# Patient Record
Sex: FEMALE | Race: WHITE | NOT HISPANIC OR LATINO | Employment: OTHER | URBAN - METROPOLITAN AREA
[De-identification: names, ages, dates, MRNs, and addresses within clinical notes are randomized per-mention and may not be internally consistent; named-entity substitution may affect disease eponyms.]

---

## 2023-03-25 PROBLEM — R07.9 CHEST PAIN: Status: ACTIVE | Noted: 2023-03-25

## 2023-03-25 PROBLEM — G24.01 DYSKINESIA, TARDIVE: Status: ACTIVE | Noted: 2023-03-25

## 2023-03-25 PROBLEM — R21 RASH OF UNKNOWN CAUSE: Status: ACTIVE | Noted: 2023-03-25

## 2023-03-25 PROBLEM — R51.9 HEADACHE: Status: ACTIVE | Noted: 2023-03-25

## 2023-03-25 PROBLEM — E03.9 HYPOTHYROIDISM: Status: ACTIVE | Noted: 2023-03-25

## 2023-03-25 PROBLEM — J45.909 ASTHMA (HHS-HCC): Status: ACTIVE | Noted: 2023-03-25

## 2023-03-25 PROBLEM — M25.559 HIP PAIN: Status: ACTIVE | Noted: 2023-03-25

## 2023-03-25 PROBLEM — R00.2 PALPITATIONS: Status: ACTIVE | Noted: 2023-03-25
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
## 2023-03-25 PROBLEM — M17.11 PRIMARY OSTEOARTHRITIS OF RIGHT KNEE: Status: ACTIVE | Noted: 2023-03-25

## 2023-03-25 PROBLEM — S69.90XA HAND INJURY: Status: ACTIVE | Noted: 2023-03-25

## 2023-03-25 PROBLEM — G47.00 INSOMNIA: Status: ACTIVE | Noted: 2023-03-25

## 2023-03-25 PROBLEM — E66.9 OBESITY: Status: ACTIVE | Noted: 2023-03-25

## 2023-03-25 PROBLEM — M19.019 ACROMIOCLAVICULAR ARTHROSIS: Status: ACTIVE | Noted: 2023-03-25

## 2023-03-25 PROBLEM — R53.81 MALAISE AND FATIGUE: Status: ACTIVE | Noted: 2023-03-25

## 2023-03-25 PROBLEM — M25.561 RIGHT KNEE PAIN: Status: ACTIVE | Noted: 2023-03-25

## 2023-03-25 PROBLEM — R29.898 WEAKNESS OF RIGHT LOWER EXTREMITY: Status: ACTIVE | Noted: 2023-03-25

## 2023-03-25 PROBLEM — F17.200 CURRENT SMOKER ON SOME DAYS: Status: ACTIVE | Noted: 2023-03-25

## 2023-03-25 PROBLEM — M65.4 TENOSYNOVITIS, DE QUERVAIN: Status: ACTIVE | Noted: 2023-03-25

## 2023-03-25 PROBLEM — N63.20 MASS OF LEFT BREAST ON MAMMOGRAM: Status: ACTIVE | Noted: 2023-03-25

## 2023-03-25 PROBLEM — M25.569 KNEE PAIN: Status: ACTIVE | Noted: 2023-03-25

## 2023-03-25 PROBLEM — J02.9 SORE THROAT: Status: ACTIVE | Noted: 2023-03-25

## 2023-03-25 PROBLEM — R73.09 ABNORMAL GLUCOSE: Status: ACTIVE | Noted: 2023-03-25

## 2023-03-25 PROBLEM — E03.9 HYPOACTIVE THYROID: Status: ACTIVE | Noted: 2023-03-25

## 2023-03-25 PROBLEM — B07.9 VERRUCOUS SKIN LESION: Status: ACTIVE | Noted: 2023-03-25

## 2023-03-25 PROBLEM — I49.1 PAC (PREMATURE ATRIAL CONTRACTION): Status: ACTIVE | Noted: 2023-03-25

## 2023-03-25 PROBLEM — S43.401A SPRAIN OF RIGHT SHOULDER: Status: ACTIVE | Noted: 2023-03-25

## 2023-03-25 PROBLEM — I25.119 ATHEROSCLEROSIS OF NATIVE CORONARY ARTERY WITH ANGINA PECTORIS (CMS-HCC): Status: ACTIVE | Noted: 2023-03-25
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
## 2023-03-25 PROBLEM — K21.9 ACID REFLUX: Status: ACTIVE | Noted: 2023-03-25

## 2023-03-25 PROBLEM — E55.9 VITAMIN D DEFICIENCY: Status: ACTIVE | Noted: 2023-03-25

## 2023-03-25 PROBLEM — S49.91XA RIGHT SHOULDER INJURY: Status: ACTIVE | Noted: 2023-03-25

## 2023-03-25 PROBLEM — B35.6 TINEA CRURIS: Status: ACTIVE | Noted: 2023-03-25

## 2023-03-25 PROBLEM — E66.812 CLASS 2 OBESITY WITH BODY MASS INDEX (BMI) OF 36.0 TO 36.9 IN ADULT: Status: ACTIVE | Noted: 2023-03-25

## 2023-03-25 PROBLEM — R13.10 DYSPHAGIA: Status: ACTIVE | Noted: 2023-03-25

## 2023-03-25 PROBLEM — Z96.659 STATUS POST TOTAL KNEE REPLACEMENT: Status: ACTIVE | Noted: 2023-03-25

## 2023-03-25 PROBLEM — I10 PRIMARY HYPERTENSION: Status: ACTIVE | Noted: 2023-03-25

## 2023-03-25 PROBLEM — I65.23 ASYMPTOMATIC BILATERAL CAROTID ARTERY STENOSIS: Status: ACTIVE | Noted: 2023-03-25

## 2023-03-25 PROBLEM — F33.9 MAJOR DEPRESSION, RECURRENT (CMS-HCC): Status: ACTIVE | Noted: 2023-03-25

## 2023-03-25 PROBLEM — I65.29 CAROTID ATHEROSCLEROSIS: Status: ACTIVE | Noted: 2023-03-25

## 2023-03-25 PROBLEM — M79.10 MYALGIA: Status: ACTIVE | Noted: 2023-03-25

## 2023-03-25 PROBLEM — M17.12 PRIMARY OSTEOARTHRITIS OF LEFT KNEE: Status: ACTIVE | Noted: 2023-03-25

## 2023-03-25 PROBLEM — E78.5 HYPERLIPIDEMIA: Status: ACTIVE | Noted: 2023-03-25

## 2023-03-25 PROBLEM — B37.2 YEAST DERMATITIS: Status: ACTIVE | Noted: 2023-03-25

## 2023-03-25 PROBLEM — L98.9 SKIN LESION: Status: ACTIVE | Noted: 2023-03-25

## 2023-03-25 PROBLEM — E66.9 CLASS 2 OBESITY WITH BODY MASS INDEX (BMI) OF 36.0 TO 36.9 IN ADULT: Status: ACTIVE | Noted: 2023-03-25

## 2023-03-25 PROBLEM — R49.0 HOARSENESS: Status: ACTIVE | Noted: 2023-03-25

## 2023-03-25 PROBLEM — A69.21: Status: ACTIVE | Noted: 2023-03-25

## 2023-03-25 PROBLEM — F41.9 ANXIETY AND DEPRESSION: Status: ACTIVE | Noted: 2023-03-25

## 2023-03-25 PROBLEM — S40.011A CONTUSION OF SHOULDER, RIGHT: Status: ACTIVE | Noted: 2023-03-25

## 2023-03-25 PROBLEM — I20.9 ANGINA PECTORIS (CMS-HCC): Status: ACTIVE | Noted: 2023-03-25

## 2023-03-25 PROBLEM — M19.049 CMC ARTHRITIS: Status: ACTIVE | Noted: 2023-03-25

## 2023-03-25 PROBLEM — R53.83 MALAISE AND FATIGUE: Status: ACTIVE | Noted: 2023-03-25

## 2023-03-25 PROBLEM — I20.9 ANGINA, CLASS II (CMS-HCC): Status: ACTIVE | Noted: 2023-03-25

## 2023-03-25 PROBLEM — I77.9 BILATERAL CAROTID ARTERY DISEASE (CMS-HCC): Status: ACTIVE | Noted: 2023-03-25

## 2023-03-25 PROBLEM — F32.A ANXIETY AND DEPRESSION: Status: ACTIVE | Noted: 2023-03-25

## 2023-03-25 PROBLEM — R05.9 COUGH: Status: ACTIVE | Noted: 2023-03-25

## 2023-03-25 PROBLEM — G89.18 POST-OP PAIN: Status: ACTIVE | Noted: 2023-03-25

## 2023-03-25 PROBLEM — M85.80 OSTEOPENIA: Status: ACTIVE | Noted: 2023-03-25

## 2023-03-25 PROBLEM — M65.30 TRIGGER FINGER: Status: ACTIVE | Noted: 2023-03-25

## 2023-03-25 PROBLEM — R63.5 WEIGHT GAIN, ABNORMAL: Status: ACTIVE | Noted: 2023-03-25

## 2023-03-25 PROBLEM — F13.939 BENZODIAZEPINE WITHDRAWAL (MULTI): Status: ACTIVE | Noted: 2023-03-25

## 2023-03-25 PROBLEM — M25.50 ARTHRALGIA: Status: ACTIVE | Noted: 2023-03-25

## 2023-03-25 PROBLEM — M79.646 FINGER PAIN: Status: ACTIVE | Noted: 2023-03-25

## 2023-03-25 RX ORDER — CLOPIDOGREL BISULFATE 75 MG/1
1 TABLET ORAL DAILY
COMMUNITY

## 2023-03-25 RX ORDER — CHOLECALCIFEROL (VITAMIN D3) 50 MCG
1 TABLET ORAL DAILY
COMMUNITY

## 2023-03-25 RX ORDER — VILAZODONE HYDROCHLORIDE 40 MG/1
1 TABLET ORAL DAILY
COMMUNITY
Start: 2018-12-27

## 2023-03-25 RX ORDER — MULTIVIT WITH MINERALS/HERBS
1 TABLET ORAL DAILY
COMMUNITY
Start: 2015-08-26

## 2023-03-25 RX ORDER — ATORVASTATIN CALCIUM 40 MG/1
1 TABLET, FILM COATED ORAL DAILY
COMMUNITY
Start: 2013-09-27

## 2023-03-25 RX ORDER — ZALEPLON 5 MG/1
5 CAPSULE ORAL
COMMUNITY
Start: 2021-05-20

## 2023-03-25 RX ORDER — LEVOTHYROXINE SODIUM 88 UG/1
1 TABLET ORAL DAILY
COMMUNITY
Start: 2013-10-03

## 2023-03-25 RX ORDER — TRAZODONE HYDROCHLORIDE 150 MG/1
2 TABLET ORAL NIGHTLY
COMMUNITY

## 2023-03-25 RX ORDER — MULTIVITAMIN/IRON/FOLIC ACID 18MG-0.4MG
TABLET ORAL
COMMUNITY
Start: 2015-08-26

## 2023-03-25 RX ORDER — CYCLOBENZAPRINE HCL 5 MG
1 TABLET ORAL NIGHTLY PRN
COMMUNITY

## 2023-03-25 RX ORDER — NABUMETONE 500 MG/1
1 TABLET, FILM COATED ORAL EVERY 12 HOURS PRN
COMMUNITY
Start: 2020-06-02 | End: 2023-11-22 | Stop reason: ALTCHOICE

## 2023-03-25 RX ORDER — CALCIUM CARBONATE 600 MG
1 TABLET ORAL DAILY
COMMUNITY

## 2023-03-25 RX ORDER — AMLODIPINE BESYLATE 5 MG/1
1 TABLET ORAL DAILY
COMMUNITY
Start: 2020-11-29

## 2023-03-25 RX ORDER — OMEPRAZOLE 40 MG/1
1 CAPSULE, DELAYED RELEASE ORAL DAILY
COMMUNITY
Start: 2015-02-04

## 2023-03-29 ENCOUNTER — OFFICE VISIT (OUTPATIENT)
Dept: PRIMARY CARE | Facility: CLINIC | Age: 73
End: 2023-03-29
Payer: MEDICARE

## 2023-03-29 VITALS
RESPIRATION RATE: 16 BRPM | SYSTOLIC BLOOD PRESSURE: 140 MMHG | WEIGHT: 192 LBS | DIASTOLIC BLOOD PRESSURE: 78 MMHG | HEART RATE: 67 BPM | OXYGEN SATURATION: 97 % | BODY MASS INDEX: 35.33 KG/M2 | HEIGHT: 62 IN

## 2023-03-29 DIAGNOSIS — E78.5 HYPERLIPIDEMIA, UNSPECIFIED HYPERLIPIDEMIA TYPE: ICD-10-CM

## 2023-03-29 DIAGNOSIS — F32.A DEPRESSION, UNSPECIFIED DEPRESSION TYPE: Primary | ICD-10-CM

## 2023-03-29 DIAGNOSIS — E03.9 HYPOTHYROIDISM, UNSPECIFIED TYPE: ICD-10-CM

## 2023-03-29 DIAGNOSIS — I10 PRIMARY HYPERTENSION: ICD-10-CM

## 2023-03-29 PROCEDURE — 99203 OFFICE O/P NEW LOW 30 MIN: CPT | Performed by: FAMILY MEDICINE

## 2023-03-29 RX ORDER — ALBUTEROL SULFATE 90 UG/1
2 AEROSOL, METERED RESPIRATORY (INHALATION) EVERY 6 HOURS PRN
COMMUNITY

## 2023-03-29 RX ORDER — BACLOFEN 20 MG
500 TABLET ORAL
COMMUNITY

## 2023-03-29 RX ORDER — BUSPIRONE HYDROCHLORIDE 7.5 MG/1
1 TABLET ORAL
COMMUNITY
Start: 2023-03-21

## 2023-03-29 ASSESSMENT — ENCOUNTER SYMPTOMS
HEMATOLOGIC/LYMPHATIC NEGATIVE: 1
EYES NEGATIVE: 1
DEPRESSION: 1
ENDOCRINE NEGATIVE: 1
OCCASIONAL FEELINGS OF UNSTEADINESS: 0
ALLERGIC/IMMUNOLOGIC NEGATIVE: 1
MUSCULOSKELETAL NEGATIVE: 1
GASTROINTESTINAL NEGATIVE: 1
CONSTITUTIONAL NEGATIVE: 1
RESPIRATORY NEGATIVE: 1
PSYCHIATRIC NEGATIVE: 1
LOSS OF SENSATION IN FEET: 0
CARDIOVASCULAR NEGATIVE: 1
NEUROLOGICAL NEGATIVE: 1

## 2023-03-29 NOTE — PROGRESS NOTES
"Subjective   Patient ID: Anh Patino is a 72 y.o. female who presents for New Patient Visit.    Annual physical  Eats a generally healthy diet  Exercises  Denies any chest pain,SOB  No Abdominal pain  No black or bloody stools  Urination/BM normal  No new family h/o cancers or heart disease       Pt c.o achy ness in both ears. X 2 weeks.   Denies ringing, drainage, muffled hearing.          Review of Systems   Constitutional: Negative.    HENT:  Positive for ear pain.    Eyes: Negative.    Respiratory: Negative.     Cardiovascular: Negative.    Gastrointestinal: Negative.    Endocrine: Negative.    Genitourinary: Negative.    Musculoskeletal: Negative.    Skin: Negative.    Allergic/Immunologic: Negative.    Neurological: Negative.    Hematological: Negative.    Psychiatric/Behavioral: Negative.         Objective   /78   Pulse 67   Resp 16   Ht 1.575 m (5' 2\")   Wt 87.1 kg (192 lb)   SpO2 97%   BMI 35.12 kg/m²     Physical Exam CONSTITUTIONAL- NAD, Pt is A & O x3, Vital signs reviewed per chart.  General Appearance- normal , good hygiene,talks easily  EYES-PERRLA conjunctiva and lids- normal  EARS/NOSE-TM's normal, head normocephalic and atraumatic, naso pharynx-no nasal discharge, no trismus,  oropharynx- normal without exudate  NECK- supple, FROM, without mass  thyroid- NT, normal size, no nodule noted  LYMPH- WNL  CV- RRR without murmur, gallop, or other abnormality.  PULM- CTA bilaterally  Respiratory effort- normal respiratory effort , no retractions or nasal flaring  ABDOMEN- normoactive BS's , soft , NT, no hepatosplenomegaly palpated, or masses. No pulsatile masses noted  extremities no edema,NT  SKIN- no abnormal skin lesions on inspection or palpation  neuro- no focal deficits  PSYCH- pleasant, normal judgement and insight      Assessment/Plan   Problem List Items Addressed This Visit       Hyperlipidemia    Hypothyroidism    Primary hypertension     Other Visit Diagnoses       Depression, " unspecified depression type    -  Primary             Scribe Attestation  By signing my name below, I, Javon Peralta DO , Scribluis daniel   attest that this documentation has been prepared under the direction and in the presence of Javon Peralta DO.

## 2023-09-05 LAB
ALANINE AMINOTRANSFERASE (SGPT) (U/L) IN SER/PLAS: 14 U/L (ref 7–45)
ALBUMIN (G/DL) IN SER/PLAS: 4 G/DL (ref 3.4–5)
ALKALINE PHOSPHATASE (U/L) IN SER/PLAS: 52 U/L (ref 33–136)
ANION GAP IN SER/PLAS: 9 MMOL/L (ref 10–20)
ASPARTATE AMINOTRANSFERASE (SGOT) (U/L) IN SER/PLAS: 18 U/L (ref 9–39)
BILIRUBIN TOTAL (MG/DL) IN SER/PLAS: 0.6 MG/DL (ref 0–1.2)
CALCIUM (MG/DL) IN SER/PLAS: 9.1 MG/DL (ref 8.6–10.3)
CARBON DIOXIDE, TOTAL (MMOL/L) IN SER/PLAS: 30 MMOL/L (ref 21–32)
CHLORIDE (MMOL/L) IN SER/PLAS: 107 MMOL/L (ref 98–107)
CHOLESTEROL (MG/DL) IN SER/PLAS: 137 MG/DL (ref 0–199)
CHOLESTEROL IN HDL (MG/DL) IN SER/PLAS: 60.1 MG/DL
CHOLESTEROL/HDL RATIO: 2.3
CREATININE (MG/DL) IN SER/PLAS: 1.11 MG/DL (ref 0.5–1.05)
ERYTHROCYTE DISTRIBUTION WIDTH (RATIO) BY AUTOMATED COUNT: 12.5 % (ref 11.5–14.5)
ERYTHROCYTE MEAN CORPUSCULAR HEMOGLOBIN CONCENTRATION (G/DL) BY AUTOMATED: 33.6 G/DL (ref 32–36)
ERYTHROCYTE MEAN CORPUSCULAR VOLUME (FL) BY AUTOMATED COUNT: 101 FL (ref 80–100)
ERYTHROCYTES (10*6/UL) IN BLOOD BY AUTOMATED COUNT: 3.88 X10E12/L (ref 4–5.2)
GFR FEMALE: 52 ML/MIN/1.73M2
GLUCOSE (MG/DL) IN SER/PLAS: 107 MG/DL (ref 74–99)
HEMATOCRIT (%) IN BLOOD BY AUTOMATED COUNT: 39.3 % (ref 36–46)
HEMOGLOBIN (G/DL) IN BLOOD: 13.2 G/DL (ref 12–16)
LDL: 53 MG/DL (ref 0–99)
LEUKOCYTES (10*3/UL) IN BLOOD BY AUTOMATED COUNT: 6.2 X10E9/L (ref 4.4–11.3)
PLATELETS (10*3/UL) IN BLOOD AUTOMATED COUNT: 207 X10E9/L (ref 150–450)
POTASSIUM (MMOL/L) IN SER/PLAS: 4.4 MMOL/L (ref 3.5–5.3)
PROTEIN TOTAL: 6.3 G/DL (ref 6.4–8.2)
SODIUM (MMOL/L) IN SER/PLAS: 142 MMOL/L (ref 136–145)
TRIGLYCERIDE (MG/DL) IN SER/PLAS: 120 MG/DL (ref 0–149)
UREA NITROGEN (MG/DL) IN SER/PLAS: 13 MG/DL (ref 6–23)
VLDL: 24 MG/DL (ref 0–40)

## 2023-09-08 ENCOUNTER — HOSPITAL ENCOUNTER (OUTPATIENT)
Dept: DATA CONVERSION | Facility: HOSPITAL | Age: 73
End: 2023-09-08
Attending: ORTHOPAEDIC SURGERY | Admitting: ORTHOPAEDIC SURGERY
Payer: MEDICARE

## 2023-09-08 DIAGNOSIS — G56.01 CARPAL TUNNEL SYNDROME, RIGHT UPPER LIMB: ICD-10-CM

## 2023-09-12 LAB
ATRIAL RATE: 57 BPM
P AXIS: 61 DEGREES
P OFFSET: 193 MS
P ONSET: 128 MS
PR INTERVAL: 168 MS
Q ONSET: 212 MS
QRS COUNT: 10 BEATS
QRS DURATION: 90 MS
QT INTERVAL: 428 MS
QTC CALCULATION(BAZETT): 416 MS
QTC FREDERICIA: 420 MS
R AXIS: -14 DEGREES
T AXIS: 0 DEGREES
T OFFSET: 426 MS
VENTRICULAR RATE: 57 BPM

## 2023-09-29 VITALS — BODY MASS INDEX: 39.17 KG/M2 | WEIGHT: 199.52 LBS | HEIGHT: 60 IN

## 2023-09-30 NOTE — H&P
History & Physical Reviewed:   I have reviewed the History and Physical dated:  24-Aug-2023   History and Physical reviewed and relevant findings noted. Patient examined to review pertinent physical  findings.: No significant changes   Home Medications Reviewed: no changes noted   Allergies Reviewed: no changes noted     Consent:   COVID-19 Consent:  ·  COVID-19 Risk Consent Surgeon has reviewed key risks related to the risk of rahul COVID-19 and if they contract COVID-19 what the risks are.       Electronic Signatures:  Mitchell Lebron ()  (Signed 08-Sep-2023 07:29)   Authored: History & Physical Reviewed, Consent, Note  Completion      Last Updated: 08-Sep-2023 07:29 by Mitchell Lebron ()

## 2023-10-01 NOTE — OP NOTE
Post Operative Note:     Post-Procedure Diagnosis: Right carpal tunnel syndrome   Procedure: 1.   2.   3.   4.   5.   Surgeon: Mitchell Lebron D.O.   Resident/Fellow/Other Assistant: ERROL Doe   Estimated Blood Loss (mL): Less than 10 cc   Specimen: no   Findings: Right carpal tunnel syndrome     Operative Report Dictated:  Dictation: not applicable - note contains Operative  Report   Operative Report:    Preoperative diagnosis: Right carpal tunnel syndrome    Postoperative diagnosis: Right carpal tunnel syndrome    Procedure planned: Right carpal tunnel release    Procedure performed: Right carpal tunnel release    Surgeon: Mitchell Lebron D.O.    Assistant:ERROL Doe  The physician assistant was present to the entire case. Given the nature of the disease process and the procedure to be performed a skilled surgical assistant was necessary during the case. The assistant was necessary in order to hold retractors and directly  assist in the operation. A certified scrub tech was at the back table managing instruments and supplies for the surgical case.    Anesthesia: Local field block using lidocaine with epinephrine solution and monitored by the anesthesia team    Estimated blood loss: Less than 10 mL    Drains: None    Tourniquet: None    Specimens: None    Implants: None    Indications: The patient presented to the office with subjective symptoms physical examination an EMG nerve conduction study test consistent with right carpal tunnel syndrome.  The patient had failure of nonoperative treatment strategies.  After full  discussion regarding risks benefits and alternatives the patient elected to forego any additional nonoperative management in favor of right carpal tunnel release.  Informed consent was signed and placed in the chart.    Complications: None noted at the time of surgery    Description of operation: The patient was taken to the operative suite and placed in the supine position on  the operating table.  A timeout was performed and the right carpal tunnel was confirmed to be the operative site.  The patient was carefully positioned  on the table in such a fashion as to pad all bony prominences and peripheral nerves.  The patient was administered appropriate preoperative IV antibiotics.  The patient was then prepped and draped in the normal sterile fashion.  After prepping and draping  a longitudinal incision was marked out directly overlying the transverse carpal ligament in line with the ring finger ray.  Forceps were used to gently grasp and pinch the skin in the zone of intended surgical dissection to check for adequate anesthesia.   After confirming adequate anesthesia the 15 blade was used to incise skin and dissect down to the subcutaneous plane.  The palmar aponeurosis was divided in line with the incision to expose the transverse carpal ligament.  The transverse carpal ligament  was then meticulously divided under direct visualization, working from distal to proximal, taking care to avoid iatrogenic injury to the underlying contents of the carpal tunnel.  The tenotomy scissors were used to release the most proximal fibers of  the transverse carpal ligament along with the most distal fibers of the antebrachial fascia.  This was done under direct visualization.  The distal release of the transverse carpal ligament was carried to the level of the fat change.  Direct inspection  and digital palpation confirmed complete release of the carpal tunnel.  Copious irrigation was performed.  Interrupted nylon stitches were used to close the skin.  A bulky nonadherent dressing was placed.  The patient was then allowed to head to recovery  in stable condition.  Overall the patient tolerated the procedure well.    Disposition: Stable to PACU          Electronic Signatures:  Mitchell Lebron ()  (Signed 08-Sep-2023 08:11)   Authored: Post Operative Note, Note Completion      Last Updated: 08-Sep-2023  08:11 by Mitchell Lebron (DO)

## 2023-11-22 ENCOUNTER — OFFICE VISIT (OUTPATIENT)
Dept: ORTHOPEDIC SURGERY | Facility: CLINIC | Age: 73
End: 2023-11-22
Payer: MEDICARE

## 2023-11-22 ENCOUNTER — ANCILLARY PROCEDURE (OUTPATIENT)
Dept: RADIOLOGY | Facility: CLINIC | Age: 73
End: 2023-11-22
Payer: MEDICARE

## 2023-11-22 DIAGNOSIS — Z47.1 AFTERCARE FOLLOWING RIGHT KNEE JOINT REPLACEMENT SURGERY: ICD-10-CM

## 2023-11-22 DIAGNOSIS — Z47.1 AFTERCARE FOLLOWING LEFT KNEE JOINT REPLACEMENT SURGERY: Primary | ICD-10-CM

## 2023-11-22 DIAGNOSIS — Z96.651 AFTERCARE FOLLOWING RIGHT KNEE JOINT REPLACEMENT SURGERY: ICD-10-CM

## 2023-11-22 DIAGNOSIS — Z96.653 STATUS POST BILATERAL KNEE REPLACEMENTS: ICD-10-CM

## 2023-11-22 DIAGNOSIS — Z96.652 AFTERCARE FOLLOWING LEFT KNEE JOINT REPLACEMENT SURGERY: Primary | ICD-10-CM

## 2023-11-22 PROCEDURE — 3008F BODY MASS INDEX DOCD: CPT | Performed by: ORTHOPAEDIC SURGERY

## 2023-11-22 PROCEDURE — 73562 X-RAY EXAM OF KNEE 3: CPT | Mod: BILATERAL PROCEDURE | Performed by: ORTHOPAEDIC SURGERY

## 2023-11-22 PROCEDURE — 99213 OFFICE O/P EST LOW 20 MIN: CPT | Performed by: ORTHOPAEDIC SURGERY

## 2023-11-22 PROCEDURE — 1036F TOBACCO NON-USER: CPT | Performed by: ORTHOPAEDIC SURGERY

## 2023-11-22 PROCEDURE — 73562 X-RAY EXAM OF KNEE 3: CPT | Mod: 50,FY

## 2023-11-22 PROCEDURE — 1159F MED LIST DOCD IN RCRD: CPT | Performed by: ORTHOPAEDIC SURGERY

## 2023-11-22 PROCEDURE — 99213 OFFICE O/P EST LOW 20 MIN: CPT | Mod: 25 | Performed by: ORTHOPAEDIC SURGERY

## 2023-11-22 RX ORDER — NABUMETONE 750 MG/1
750 TABLET, FILM COATED ORAL 2 TIMES DAILY
Qty: 90 TABLET | Refills: 3 | Status: SHIPPED | OUTPATIENT
Start: 2023-11-22 | End: 2024-05-20

## 2023-11-22 NOTE — PROGRESS NOTES
History of present illness    73-year-old female here for follow-up as needed she has had her left knee replaced in December 2007 her right knee revision was February 2010 she states both knees are feeling pretty good the Relafen seems to be helping she does not have any side effects from the medication she is ambulating without assistive eyes no numbness or tingling no fevers or chills no locking or giving way.      Past medical , Surgical, Family and social history reviewed.      Physical exam  General: No acute distress and breathing comfortably.  Patient is pleasant and cooperative with the examination.    Extremity  Both knees have excellent range of motion without instability no tenderness brisk cap refill compartments are soft calf is nontender    Diagnostics  See dictated x-ray results    No results found.     Procedure  [ none]    Assessment  Status post bilateral knee replacements    Treatment plan  1.  Continue working on range of motion strengthening.  She is requesting a refill on her Relafen which is sent to pharmacy.  Long-term use of nonsteroidal anti-inflammatory drugs was discussed as part of the treatment plan.  We discussed that these may result in GI upset and/or bleeding.  Any stomach pain or dark stools would be reason to discontinue use.  We discussed that when used over long-term these medications can result in altered renal or hepatic function and it used to be on 3 months requires follow-up with her primary provider for monitoring.  Patient expressed understanding and agree with this plan.  2.  She will follow-up if she has increased pain or discomfort otherwise..  3. [   ]  4.  All of the patient's questions were answered.    This note was prepared using voice recognition software.  The details of this note are correct and have been reviewed, and corrected to the best of my ability.  Some grammatical areas may persist related to the Dragon software    Marty WATSON  MD Abigail  Senior Attending Physician  [facility]  [facility] [place]    [phone]

## 2024-02-26 ENCOUNTER — HOSPITAL ENCOUNTER (OUTPATIENT)
Dept: RADIOLOGY | Facility: CLINIC | Age: 74
Discharge: HOME | End: 2024-02-26
Payer: MEDICARE

## 2024-02-26 ENCOUNTER — OFFICE VISIT (OUTPATIENT)
Dept: ORTHOPEDIC SURGERY | Facility: CLINIC | Age: 74
End: 2024-02-26
Payer: MEDICARE

## 2024-02-26 DIAGNOSIS — M25.511 RIGHT SHOULDER PAIN, UNSPECIFIED CHRONICITY: ICD-10-CM

## 2024-02-26 PROCEDURE — 73030 X-RAY EXAM OF SHOULDER: CPT | Mod: RT

## 2024-02-26 PROCEDURE — 1036F TOBACCO NON-USER: CPT | Performed by: ORTHOPAEDIC SURGERY

## 2024-02-26 PROCEDURE — 1157F ADVNC CARE PLAN IN RCRD: CPT | Performed by: ORTHOPAEDIC SURGERY

## 2024-02-26 PROCEDURE — 73030 X-RAY EXAM OF SHOULDER: CPT | Mod: RIGHT SIDE | Performed by: ORTHOPAEDIC SURGERY

## 2024-02-26 PROCEDURE — 99214 OFFICE O/P EST MOD 30 MIN: CPT | Performed by: ORTHOPAEDIC SURGERY

## 2024-02-26 RX ORDER — METHYLPREDNISOLONE 4 MG/1
TABLET ORAL
Qty: 1 EACH | Refills: 0 | Status: SHIPPED | OUTPATIENT
Start: 2024-02-26

## 2024-02-26 NOTE — PROGRESS NOTES
History of Present Illness   Chief Complaint   Patient presents with    Right Shoulder - Establish Care     Pt. Known to Dr. Ocampo, had a Rt RCR in 2017  Fell about 1 month ago  X-rays today       History of Present Illness   Patient comes in today after a fall a month ago.  She had a right shoulder mini open rotator cuff repair of subscapularis and supraspinatus, biceps tenodesis on 4/26/2017  She states that shoulder is doing great since her surgery.  This is most reasonable adjustment little sore and uncomfortable she feels little bit weak no other specific concerns     imaging  Shoulder: No acute fractures dislocations.  No arthritic change.     Assessment:   Right shoulder rotator cuff contusion with possible rotator cuff tear    Plan:  We reviewed the role of imaging, physical therapy, injections and the time frame to healing and correlation with outcome.  1.  Medrol Dosepak: Medication benefits and risks discussed  2.  NSAID: Nabumetone sent to the pharmacy.  GI side effects and medical risks discussed  3.  Ice: 30 minutes on and off  4.  Exercise home program: Medically directed Shoulder therapy / Handout given  5.  Follow-up in 4 weeks if weakness persist.  MRI     Physical Exam  Well-nourished, well-developed. No acute distress. Alert and oriented x3. Responds appropriately to questioning. Good mood. Normal affect.  Physical Exam  Right shoulder:  Skin healthy to gross inspection. No ecchymosis, no swelling, no gross atrophy  No tenderness to palpation over acromioclavicular joint  Mild pain in the biceps  ROM: 130 forward flexion  Strength: 4.5/5 Resisted elevation, external rotation   Pain with lift off and Speeds test + impingement  Negative Spurling´s test  Positive Neer and Hawkin´s test  Neurovascular exam normal distally     Review of Systems   GENERAL: Negative for malaise, significant weight loss, fever  MUSCULOSKELETAL: See HPI  NEURO:  Negative     Past Medical History:   Diagnosis Date     Acute candidiasis of vulva and vagina 09/04/2015    Vaginal candidiasis    Cervicalgia 04/13/2016    Neck pain    Disorientation, unspecified 08/10/2015    Episodic confusion    Encounter for other screening for malignant neoplasm of breast 09/04/2015    Screening for breast cancer    Impacted cerumen, left ear 01/11/2016    Impacted cerumen of left ear    Insect bite (nonvenomous) of lower back and pelvis, initial encounter 09/04/2015    Tick bite of back    Lyme disease, unspecified 09/04/2015    Erythema chronicum migrans    Other acute postprocedural pain 03/30/2022    Post-op pain    Other symptoms and signs involving the musculoskeletal system 06/29/2015    Weakness of hand    Personal history of diseases of the skin and subcutaneous tissue 01/11/2016    History of seborrhea    Personal history of other diseases of the circulatory system 03/05/2015    History of hypertension    Personal history of other diseases of the circulatory system     History of carotid artery stenosis    Personal history of other diseases of the musculoskeletal system and connective tissue 09/25/2015    History of backache    Personal history of other diseases of the respiratory system 07/01/2016    History of acute bronchitis    Personal history of other mental and behavioral disorders 04/06/2016    History of depression    Personal history of other specified conditions 09/25/2015    History of urinary frequency    Personal history of other specified conditions 08/30/2015    History of headache    Unspecified condition associated with female genital organs and menstrual cycle 01/11/2016    Vaginal burning    Urinary tract infection, site not specified 09/25/2015    Acute UTI    Urinary tract infection, site not specified 09/25/2015    Acute lower UTI    Urinary tract infection, site not specified 01/11/2016    UTI (lower urinary tract infection)       Medication Documentation Review Audit       Reviewed by Haleigh Alonso MA  (Medical Assistant) on 11/22/23 at 1446      Medication Order Taking? Sig Documenting Provider Last Dose Status   albuterol 90 mcg/actuation inhaler [id number] No Inhale 2 puffs every 6 hours if needed for wheezing. Historical MD Ashish Taking Active   amLODIPine (Norvasc) 5 mg tablet [id number] No Take 1 tablet (5 mg) by mouth once daily. Historical MD Ashish Taking Active   atorvastatin (Lipitor) 40 mg tablet [id number] No Take 1 tablet (40 mg) by mouth once daily. Historical MD Ashish Taking Active   busPIRone (Buspar) 7.5 mg tablet [id number] No Take 1 tablet (7.5 mg) by mouth every 6 hours during the day. Historical MD Ashish Taking Active   calcium carbonate 600 mg calcium (1,500 mg) tablet [id number] No Take 1 tablet (600 mg) by mouth once daily. Historical MD Ashish Taking Active   cholecalciferol (Vitamin D-3) 50 MCG (2000 UT) tablet [id number] No Take 1 tablet (50 mcg) by mouth once daily. Historical MD Ashish Taking Active   clopidogrel (Plavix) 75 mg tablet [id number] No Take 1 tablet (75 mg) by mouth once daily. Historical MD Ashish Taking Active   cyclobenzaprine (Flexeril) 5 mg tablet [id number] No Take 1 tablet (5 mg) by mouth as needed at bedtime for muscle spasms. Historical MD Ashish Not Taking Active   glucosamine/chondr lopez A sod (glucosamine-chondroitin) 1,500-1,200 mg/30 mL liquid [id number] No Take by mouth. Use PO as directed Historical MD Ashish Taking Active   levothyroxine (Synthroid, Levoxyl) 88 mcg tablet [id number] No Take 1 tablet (88 mcg) by mouth once daily. Historical MD Ashish Taking Active   magnesium oxide 500 mg tablet [id number] No Take 1 tablet (500 mg) by mouth once daily. Historical MD Ashish Taking Active   nabumetone (Relafen) 500 mg tablet [id number] No Take 1 tablet (500 mg) by mouth every 12 hours if needed. Greta Hinojosa MD Taking Active   omeprazole (PriLOSEC) 40 mg DR capsule [id number] No Take 1 capsule (40 mg) by mouth once daily. Historical Ashish  MD Not Taking Active   traZODone (Desyrel) 150 mg tablet [id number] No Take 2 tablets (300 mg) by mouth once daily at bedtime. Historical Provider, MD Taking Active   vilazodone (Viibryd) 40 mg tablet [id number] No Take 1 tablet (40 mg) by mouth once daily. Historical Provider, MD Taking Active   vitamin B complex tablet [id number] No Take 1 tablet by mouth once daily. Historical Provider, MD Taking Active   zaleplon (Sonata) 5 mg capsule [id number] No Take 1 capsule (5 mg) by mouth. Historical Provider, MD Taking Active                    Allergies   Allergen Reactions    Colesevelam Other    Diazepam Unknown    Duloxetine Seizure    Fluvastatin Unknown    Iodides Unknown    Iodinated Contrast Media Unknown    Iodine Unknown    Latex Unknown    Morphine Unknown    Other Unknown     ADHESIVE BANDAGES    Pravastatin Unknown       Social History     Socioeconomic History    Marital status:      Spouse name: Not on file    Number of children: Not on file    Years of education: Not on file    Highest education level: Not on file   Occupational History    Not on file   Tobacco Use    Smoking status: Former     Types: Cigarettes    Smokeless tobacco: Never   Substance and Sexual Activity    Alcohol use: Never    Drug use: Not on file    Sexual activity: Not on file   Other Topics Concern    Not on file   Social History Narrative    Not on file     Social Determinants of Health     Financial Resource Strain: Not on file   Food Insecurity: Not on file   Transportation Needs: Not on file   Physical Activity: Not on file   Stress: Not on file   Social Connections: Not on file   Intimate Partner Violence: Not on file   Housing Stability: Not on file       Past Surgical History:   Procedure Laterality Date    APPENDECTOMY  04/07/2015    Appendectomy    BLADDER SURGERY  04/07/2015    Bladder Surgery    BREAST BIOPSY  12/02/2016    Biopsy Breast Percutaneous Needle Core    GALLBLADDER SURGERY  04/07/2015    Gallbladder Surgery     HERNIA REPAIR  04/07/2015    Hernia Repair    HYSTERECTOMY  04/07/2015    Hysterectomy    INCISIONAL BREAST BIOPSY  12/02/2016    Incisional Breast Biopsy    OTHER SURGICAL HISTORY  03/05/2015    Install Vagal Nerve Neurostimulator By Incision With Pulse Generator    OTHER SURGICAL HISTORY  12/02/2016    Salpingo-oophorectomy    OTHER SURGICAL HISTORY  04/07/2015    Breast Surgery Reduction Procedure Elective    OTHER SURGICAL HISTORY  07/13/2022    Cataract surgery    OTHER SURGICAL HISTORY  07/13/2022    Cardiac catheterization    OTHER SURGICAL HISTORY  07/13/2022    Arm surgery    OTHER SURGICAL HISTORY  07/13/2022    Knee surgery    OTHER SURGICAL HISTORY  07/13/2022    Colonoscopy    OTHER SURGICAL HISTORY  07/13/2022    Rotator cuff repair    OTHER SURGICAL HISTORY  07/13/2022    Surgery    TOTAL KNEE ARTHROPLASTY  04/07/2015    Knee Replacement       No results found.

## 2024-03-25 ENCOUNTER — OFFICE VISIT (OUTPATIENT)
Dept: ORTHOPEDIC SURGERY | Facility: CLINIC | Age: 74
End: 2024-03-25
Payer: MEDICARE

## 2024-03-25 DIAGNOSIS — M25.511 RIGHT SHOULDER PAIN, UNSPECIFIED CHRONICITY: ICD-10-CM

## 2024-03-25 DIAGNOSIS — M75.41 IMPINGEMENT SYNDROME OF RIGHT SHOULDER: ICD-10-CM

## 2024-03-25 PROCEDURE — 99214 OFFICE O/P EST MOD 30 MIN: CPT | Performed by: ORTHOPAEDIC SURGERY

## 2024-03-25 PROCEDURE — 1036F TOBACCO NON-USER: CPT | Performed by: ORTHOPAEDIC SURGERY

## 2024-03-25 PROCEDURE — 1157F ADVNC CARE PLAN IN RCRD: CPT | Performed by: ORTHOPAEDIC SURGERY

## 2024-03-25 NOTE — PROGRESS NOTES
History of Present Illness   Chief Complaint   Patient presents with    Right Shoulder - Follow-up     Rotator Cuff contusion/possible tear  Hx of RCR in 2017  After Medrol/Nabemetone/PT       History of Present Illness   She had a right shoulder mini open rotator cuff repair of subscapularis and supraspinatus, biceps tenodesis on 4/26/2017  Patient had a fall 2 months ago and she had persistent weakness.  She been treated conservatively with oral steroid as well as an nabumetone.  Unfortunately she significant persistently weak.  She feels like she cannot use it for normal daily activities.  States her prior to her fall was excellent strength and function.  This is dramatically different.  Home exercise program is not improving.  Has notable history of a vagal nerve stimulator and is unable to get a MRI   imaging  Shoulder: No acute fractures dislocations.  No arthritic change.     Assessment:   Right shoulder rotator cuff tear    Plan:  We reviewed the role of imaging, physical therapy, injections and the time frame to healing and correlation with outcome.  1.  CT arthrogram of her right shoulder to further delineate nature location of a full-thickness rotator cuff tear.  Discussed partial versus full-thickness tear in terms of surgical and nonoperative pathology.  2.  NSAID: Nabumetone sent to the pharmacy.  GI side effects and medical risks discussed  3.  Ice: 30 minutes on and off  4.  Exercise home program: Medically directed Shoulder therapy / Handout given  5.  Follow-up after CT arthrogram     Physical Exam  Well-nourished, well-developed. No acute distress. Alert and oriented x3. Responds appropriately to questioning. Good mood. Normal affect.  Physical Exam  Right shoulder:  Skin healthy to gross inspection. No ecchymosis, no swelling, no gross atrophy  No tenderness to palpation over acromioclavicular joint  Mild pain in the biceps  ROM: 130 forward flexion  Strength: 4.5/5 Resisted elevation, 4/5  external rotation, unable to forward flex actively beyond 100 degrees  Pain with lift off and Speeds test + impingement  Negative Spurling´s test  Positive Neer and Hawkin´s test  Neurovascular exam normal distally     Review of Systems   GENERAL: Negative for malaise, significant weight loss, fever  MUSCULOSKELETAL: See HPI  NEURO:  Negative     Past Medical History:   Diagnosis Date    Acute candidiasis of vulva and vagina 09/04/2015    Vaginal candidiasis    Cervicalgia 04/13/2016    Neck pain    Disorientation, unspecified 08/10/2015    Episodic confusion    Encounter for other screening for malignant neoplasm of breast 09/04/2015    Screening for breast cancer    Impacted cerumen, left ear 01/11/2016    Impacted cerumen of left ear    Insect bite (nonvenomous) of lower back and pelvis, initial encounter 09/04/2015    Tick bite of back    Lyme disease, unspecified 09/04/2015    Erythema chronicum migrans    Other acute postprocedural pain 03/30/2022    Post-op pain    Other symptoms and signs involving the musculoskeletal system 06/29/2015    Weakness of hand    Personal history of diseases of the skin and subcutaneous tissue 01/11/2016    History of seborrhea    Personal history of other diseases of the circulatory system 03/05/2015    History of hypertension    Personal history of other diseases of the circulatory system     History of carotid artery stenosis    Personal history of other diseases of the musculoskeletal system and connective tissue 09/25/2015    History of backache    Personal history of other diseases of the respiratory system 07/01/2016    History of acute bronchitis    Personal history of other mental and behavioral disorders 04/06/2016    History of depression    Personal history of other specified conditions 09/25/2015    History of urinary frequency    Personal history of other specified conditions 08/30/2015    History of headache    Unspecified condition associated with female genital  organs and menstrual cycle 01/11/2016    Vaginal burning    Urinary tract infection, site not specified 09/25/2015    Acute UTI    Urinary tract infection, site not specified 09/25/2015    Acute lower UTI    Urinary tract infection, site not specified 01/11/2016    UTI (lower urinary tract infection)       Medication Documentation Review Audit       Reviewed by Haleigh Alonso MA (Medical Assistant) on 11/22/23 at 1446      Medication Order Taking? Sig Documenting Provider Last Dose Status   albuterol 90 mcg/actuation inhaler [id number] No Inhale 2 puffs every 6 hours if needed for wheezing. Historical MD Ashish Taking Active   amLODIPine (Norvasc) 5 mg tablet [id number] No Take 1 tablet (5 mg) by mouth once daily. Historical Provider, MD Taking Active   atorvastatin (Lipitor) 40 mg tablet [id number] No Take 1 tablet (40 mg) by mouth once daily. Historical MD Ashish Taking Active   busPIRone (Buspar) 7.5 mg tablet [id number] No Take 1 tablet (7.5 mg) by mouth every 6 hours during the day. Historical MD Ashish Taking Active   calcium carbonate 600 mg calcium (1,500 mg) tablet [id number] No Take 1 tablet (600 mg) by mouth once daily. Historical Provider, MD Taking Active   cholecalciferol (Vitamin D-3) 50 MCG (2000 UT) tablet [id number] No Take 1 tablet (50 mcg) by mouth once daily. Historical MD Ashish Taking Active   clopidogrel (Plavix) 75 mg tablet [id number] No Take 1 tablet (75 mg) by mouth once daily. Historical MD Ashish Taking Active   cyclobenzaprine (Flexeril) 5 mg tablet [id number] No Take 1 tablet (5 mg) by mouth as needed at bedtime for muscle spasms. Historical Provider, MD Not Taking Active   glucosamine/chondr lopez A sod (glucosamine-chondroitin) 1,500-1,200 mg/30 mL liquid [id number] No Take by mouth. Use PO as directed Historical MD Ashish Taking Active   levothyroxine (Synthroid, Levoxyl) 88 mcg tablet [id number] No Take 1 tablet (88 mcg) by mouth once daily. Greta Hinojosa MD Taking Active    magnesium oxide 500 mg tablet [id number] No Take 1 tablet (500 mg) by mouth once daily. Historical Provider, MD Taking Active   nabumetone (Relafen) 500 mg tablet [id number] No Take 1 tablet (500 mg) by mouth every 12 hours if needed. Historical Provider, MD Taking Active   omeprazole (PriLOSEC) 40 mg DR capsule [id number] No Take 1 capsule (40 mg) by mouth once daily. Historical Provider, MD Not Taking Active   traZODone (Desyrel) 150 mg tablet [id number] No Take 2 tablets (300 mg) by mouth once daily at bedtime. Historical Provider, MD Taking Active   vilazodone (Viibryd) 40 mg tablet [id number] No Take 1 tablet (40 mg) by mouth once daily. Historical Provider, MD Taking Active   vitamin B complex tablet [id number] No Take 1 tablet by mouth once daily. Historical Provider, MD Taking Active   zaleplon (Sonata) 5 mg capsule [id number] No Take 1 capsule (5 mg) by mouth. Historical Provider, MD Taking Active                    Allergies   Allergen Reactions    Colesevelam Other    Diazepam Unknown    Duloxetine Seizure    Fluvastatin Unknown    Iodides Unknown    Iodinated Contrast Media Unknown    Iodine Unknown    Latex Unknown    Morphine Unknown    Other Unknown     ADHESIVE BANDAGES    Pravastatin Unknown       Social History     Socioeconomic History    Marital status:      Spouse name: Not on file    Number of children: Not on file    Years of education: Not on file    Highest education level: Not on file   Occupational History    Not on file   Tobacco Use    Smoking status: Former     Types: Cigarettes    Smokeless tobacco: Never   Substance and Sexual Activity    Alcohol use: Never    Drug use: Not on file    Sexual activity: Not on file   Other Topics Concern    Not on file   Social History Narrative    Not on file     Social Determinants of Health     Financial Resource Strain: Not on file   Food Insecurity: Not on file   Transportation Needs: Not on file   Physical Activity: Not on file   Stress: Not on file    Social Connections: Not on file   Intimate Partner Violence: Not on file   Housing Stability: Not on file       Past Surgical History:   Procedure Laterality Date    APPENDECTOMY  04/07/2015    Appendectomy    BLADDER SURGERY  04/07/2015    Bladder Surgery    BREAST BIOPSY  12/02/2016    Biopsy Breast Percutaneous Needle Core    GALLBLADDER SURGERY  04/07/2015    Gallbladder Surgery    HERNIA REPAIR  04/07/2015    Hernia Repair    HYSTERECTOMY  04/07/2015    Hysterectomy    INCISIONAL BREAST BIOPSY  12/02/2016    Incisional Breast Biopsy    OTHER SURGICAL HISTORY  03/05/2015    Install Vagal Nerve Neurostimulator By Incision With Pulse Generator    OTHER SURGICAL HISTORY  12/02/2016    Salpingo-oophorectomy    OTHER SURGICAL HISTORY  04/07/2015    Breast Surgery Reduction Procedure Elective    OTHER SURGICAL HISTORY  07/13/2022    Cataract surgery    OTHER SURGICAL HISTORY  07/13/2022    Cardiac catheterization    OTHER SURGICAL HISTORY  07/13/2022    Arm surgery    OTHER SURGICAL HISTORY  07/13/2022    Knee surgery    OTHER SURGICAL HISTORY  07/13/2022    Colonoscopy    OTHER SURGICAL HISTORY  07/13/2022    Rotator cuff repair    OTHER SURGICAL HISTORY  07/13/2022    Surgery    TOTAL KNEE ARTHROPLASTY  04/07/2015    Knee Replacement       No results found.

## 2024-04-04 DIAGNOSIS — M75.41 IMPINGEMENT SYNDROME OF RIGHT SHOULDER: ICD-10-CM

## 2024-04-04 DIAGNOSIS — M25.511 RIGHT SHOULDER PAIN, UNSPECIFIED CHRONICITY: ICD-10-CM

## 2024-04-29 ENCOUNTER — HOSPITAL ENCOUNTER (OUTPATIENT)
Dept: RADIOLOGY | Facility: HOSPITAL | Age: 74
Discharge: HOME | End: 2024-04-29
Payer: MEDICARE

## 2024-04-29 DIAGNOSIS — M75.41 IMPINGEMENT SYNDROME OF RIGHT SHOULDER: ICD-10-CM

## 2024-04-29 DIAGNOSIS — M25.511 RIGHT SHOULDER PAIN, UNSPECIFIED CHRONICITY: ICD-10-CM

## 2024-04-29 PROCEDURE — 73200 CT UPPER EXTREMITY W/O DYE: CPT | Mod: RIGHT SIDE | Performed by: RADIOLOGY

## 2024-04-29 PROCEDURE — 73200 CT UPPER EXTREMITY W/O DYE: CPT | Mod: RT

## 2024-05-06 ENCOUNTER — OFFICE VISIT (OUTPATIENT)
Dept: ORTHOPEDIC SURGERY | Facility: CLINIC | Age: 74
End: 2024-05-06
Payer: MEDICARE

## 2024-05-06 DIAGNOSIS — M75.41 IMPINGEMENT SYNDROME OF RIGHT SHOULDER: ICD-10-CM

## 2024-05-06 DIAGNOSIS — M25.511 RIGHT SHOULDER PAIN, UNSPECIFIED CHRONICITY: Primary | ICD-10-CM

## 2024-05-06 DIAGNOSIS — M25.511 RIGHT SHOULDER PAIN, UNSPECIFIED CHRONICITY: ICD-10-CM

## 2024-05-06 PROCEDURE — 99214 OFFICE O/P EST MOD 30 MIN: CPT | Performed by: ORTHOPAEDIC SURGERY

## 2024-05-06 PROCEDURE — 1157F ADVNC CARE PLAN IN RCRD: CPT | Performed by: ORTHOPAEDIC SURGERY

## 2024-05-06 PROCEDURE — 1159F MED LIST DOCD IN RCRD: CPT | Performed by: ORTHOPAEDIC SURGERY

## 2024-05-06 NOTE — PROGRESS NOTES
History of Present Illness   Chief Complaint   Patient presents with    Right Shoulder - Follow-up     CT scan results done at        History of Present Illness:   She had a right shoulder mini open rotator cuff repair of subscapularis and supraspinatus, biceps tenodesis on 4/26/2017. She has moderate weakness in her shoulder. She has a nerve stimulator and cannot get an MRI.     Patient had a fall 2 months ago and she had persistent weakness.  She been treated conservatively with oral steroid as well as an nabumetone.  Unfortunately she significant persistently weak.  She feels like she cannot use it for normal daily activities.  States her prior to her fall was excellent strength and function.  This is dramatically different.  Home exercise program is not improving.  Has notable history of a vagal nerve stimulator and is unable to get a MRI    Imaging:  Shoulder: No acute fractures dislocations.  No arthritic change.     Assessment:   Right shoulder rotator cuff tear    Plan:  We reviewed the role of imaging, physical therapy, injections and the time frame to healing and correlation with outcome.  1.  Will coordinate a CT arthrogram with contrast. Will call in oral steroid beforhand. Her allergy was to IV dye when she was a child.  2.  NSAID: Nabumetone sent to the pharmacy.  GI side effects and medical risks discussed  3.  Ice: 60 minutes on and off  4.  Exercise home program: Medically directed Shoulder therapy / Handout given  Follow-up after CT arthrogram     Physical Exam:  Well-nourished, well-developed. No acute distress. Alert and oriented x3. Responds appropriately to questioning. Good mood. Normal affect.  Physical Exam  Right shoulder:  Skin healthy to gross inspection. No ecchymosis, no swelling, no gross atrophy  No tenderness to palpation over acromioclavicular joint  Mild pain in the biceps  ROM: 130 forward flexion  Strength: 4.5/5 Resisted elevation, 4/5 external rotation, unable to forward  flex actively beyond 100 degrees  Pain with lift off and Speeds test + impingement  Negative Spurling´s test  Positive Neer and Hawkin´s test  Neurovascular exam normal distally     Review of Systems:  GENERAL: Negative for malaise, significant weight loss, fever  MUSCULOSKELETAL: See HPI  NEURO:  Negative     Past Medical History:   Diagnosis Date    Acute candidiasis of vulva and vagina 09/04/2015    Vaginal candidiasis    Cervicalgia 04/13/2016    Neck pain    Disorientation, unspecified 08/10/2015    Episodic confusion    Encounter for other screening for malignant neoplasm of breast 09/04/2015    Screening for breast cancer    Impacted cerumen, left ear 01/11/2016    Impacted cerumen of left ear    Insect bite (nonvenomous) of lower back and pelvis, initial encounter 09/04/2015    Tick bite of back    Lyme disease, unspecified 09/04/2015    Erythema chronicum migrans    Other acute postprocedural pain 03/30/2022    Post-op pain    Other symptoms and signs involving the musculoskeletal system 06/29/2015    Weakness of hand    Personal history of diseases of the skin and subcutaneous tissue 01/11/2016    History of seborrhea    Personal history of other diseases of the circulatory system 03/05/2015    History of hypertension    Personal history of other diseases of the circulatory system     History of carotid artery stenosis    Personal history of other diseases of the musculoskeletal system and connective tissue 09/25/2015    History of backache    Personal history of other diseases of the respiratory system 07/01/2016    History of acute bronchitis    Personal history of other mental and behavioral disorders 04/06/2016    History of depression    Personal history of other specified conditions 09/25/2015    History of urinary frequency    Personal history of other specified conditions 08/30/2015    History of headache    Unspecified condition associated with female genital organs and menstrual cycle 01/11/2016     Vaginal burning    Urinary tract infection, site not specified 09/25/2015    Acute UTI    Urinary tract infection, site not specified 09/25/2015    Acute lower UTI    Urinary tract infection, site not specified 01/11/2016    UTI (lower urinary tract infection)       Medication Documentation Review Audit       Reviewed by Jackeline Avila MA (Medical Assistant) on 05/06/24 at 1333      Medication Order Taking? Sig Documenting Provider Last Dose Status   albuterol 90 mcg/actuation inhaler [id number] No Inhale 2 puffs every 6 hours if needed for wheezing. Historical MD Ashish Taking Active   amLODIPine (Norvasc) 5 mg tablet [id number] No Take 1 tablet (5 mg) by mouth once daily. Historical Provider, MD Taking Active   atorvastatin (Lipitor) 40 mg tablet [id number] No Take 1 tablet (40 mg) by mouth once daily. Historical MD Ashish Taking Active   busPIRone (Buspar) 7.5 mg tablet [id number] No Take 1 tablet (7.5 mg) by mouth every 6 hours during the day. Historical Provider, MD Taking Active   calcium carbonate 600 mg calcium (1,500 mg) tablet [id number] No Take 1 tablet (600 mg) by mouth once daily. Historical Provider, MD Taking Active   cholecalciferol (Vitamin D-3) 50 MCG (2000 UT) tablet [id number] No Take 1 tablet (50 mcg) by mouth once daily. Historical Provider, MD Taking Active   clopidogrel (Plavix) 75 mg tablet [id number] No Take 1 tablet (75 mg) by mouth once daily. Historical MD Ashish Taking Active   cyclobenzaprine (Flexeril) 5 mg tablet [id number] No Take 1 tablet (5 mg) by mouth as needed at bedtime for muscle spasms. Historical MD Ashish Not Taking Active   glucosamine/chondr lopez A sod (glucosamine-chondroitin) 1,500-1,200 mg/30 mL liquid [id number] No Take by mouth. Use PO as directed Historical MD Ashish Taking Active   levothyroxine (Synthroid, Levoxyl) 88 mcg tablet [id number] No Take 1 tablet (88 mcg) by mouth once daily. Greta Hinojosa MD Taking Active   magnesium oxide 500 mg tablet [id number] No  Take 1 tablet (500 mg) by mouth once daily. Historical Provider, MD Taking Active   methylPREDNISolone (Medrol Dospak) 4 mg tablets [id number]  Follow schedule on package instructions Khari Ocampo MD  Active   nabumetone (Relafen) 750 mg tablet [id number]  Take 1 tablet (750 mg) by mouth 2 times a day. Marty Hayes MD  Active   omeprazole (PriLOSEC) 40 mg DR capsule [id number] No Take 1 capsule (40 mg) by mouth once daily. Historical Provider, MD Not Taking Active   traZODone (Desyrel) 150 mg tablet [id number] No Take 2 tablets (300 mg) by mouth once daily at bedtime. Historical Provider, MD Taking Active   vilazodone (Viibryd) 40 mg tablet [id number] No Take 1 tablet (40 mg) by mouth once daily. Historical Provider, MD Taking Active   vitamin B complex tablet [id number] No Take 1 tablet by mouth once daily. Historical Provider, MD Taking Active   zaleplon (Sonata) 5 mg capsule [id number] No Take 1 capsule (5 mg) by mouth. Historical Provider, MD Taking Active                    Allergies   Allergen Reactions    Colesevelam Other    Diazepam Unknown    Duloxetine Seizure    Fluvastatin Unknown    Iodides Unknown    Iodinated Contrast Media Unknown    Iodine Unknown    Latex Unknown    Morphine Unknown    Other Unknown     ADHESIVE BANDAGES    Pravastatin Unknown       Social History     Socioeconomic History    Marital status:      Spouse name: Not on file    Number of children: Not on file    Years of education: Not on file    Highest education level: Not on file   Occupational History    Not on file   Tobacco Use    Smoking status: Former     Types: Cigarettes    Smokeless tobacco: Never   Substance and Sexual Activity    Alcohol use: Never    Drug use: Not on file    Sexual activity: Not on file   Other Topics Concern    Not on file   Social History Narrative    Not on file     Social Determinants of Health     Financial Resource Strain: Not on file   Food Insecurity: Not on file   Transportation Needs: Not  on file   Physical Activity: Not on file   Stress: Not on file   Social Connections: Not on file   Intimate Partner Violence: Not on file   Housing Stability: Not on file       Past Surgical History:   Procedure Laterality Date    APPENDECTOMY  04/07/2015    Appendectomy    BLADDER SURGERY  04/07/2015    Bladder Surgery    BREAST BIOPSY  12/02/2016    Biopsy Breast Percutaneous Needle Core    GALLBLADDER SURGERY  04/07/2015    Gallbladder Surgery    HERNIA REPAIR  04/07/2015    Hernia Repair    HYSTERECTOMY  04/07/2015    Hysterectomy    INCISIONAL BREAST BIOPSY  12/02/2016    Incisional Breast Biopsy    OTHER SURGICAL HISTORY  03/05/2015    Install Vagal Nerve Neurostimulator By Incision With Pulse Generator    OTHER SURGICAL HISTORY  12/02/2016    Salpingo-oophorectomy    OTHER SURGICAL HISTORY  04/07/2015    Breast Surgery Reduction Procedure Elective    OTHER SURGICAL HISTORY  07/13/2022    Cataract surgery    OTHER SURGICAL HISTORY  07/13/2022    Cardiac catheterization    OTHER SURGICAL HISTORY  07/13/2022    Arm surgery    OTHER SURGICAL HISTORY  07/13/2022    Knee surgery    OTHER SURGICAL HISTORY  07/13/2022    Colonoscopy    OTHER SURGICAL HISTORY  07/13/2022    Rotator cuff repair    OTHER SURGICAL HISTORY  07/13/2022    Surgery    TOTAL KNEE ARTHROPLASTY  04/07/2015    Knee Replacement       No results found.                           Scribe Attestation  By signing my name below, I, Matthew Coleman   attest that this documentation has been prepared under the direction and in the presence of Khari Ocampo MD.

## 2024-05-07 ENCOUNTER — TELEPHONE (OUTPATIENT)
Dept: ORTHOPEDIC SURGERY | Facility: CLINIC | Age: 74
End: 2024-05-07
Payer: MEDICARE

## 2024-05-07 RX ORDER — PREDNISONE 50 MG/1
TABLET ORAL
Qty: 3 TABLET | Refills: 0 | Status: SHIPPED | OUTPATIENT
Start: 2024-05-07

## 2024-05-07 NOTE — TELEPHONE ENCOUNTER
Let patient know that Prednisone was sent to her pharmacy for when she gets her CT Arthrogram. Also, informed her to take Benadryl 50mg PO 1 hour prior to the CT Arthrogram.

## 2024-05-20 DIAGNOSIS — M25.511 RIGHT SHOULDER PAIN, UNSPECIFIED CHRONICITY: ICD-10-CM

## 2024-05-20 DIAGNOSIS — M75.41 IMPINGEMENT SYNDROME OF RIGHT SHOULDER: ICD-10-CM

## 2024-05-20 RX ORDER — PREDNISONE 10 MG/1
TABLET ORAL
Qty: 3 TABLET | Refills: 0 | Status: SHIPPED | OUTPATIENT
Start: 2024-05-20

## 2024-06-03 DIAGNOSIS — M75.41 IMPINGEMENT SYNDROME OF RIGHT SHOULDER: ICD-10-CM

## 2024-06-03 RX ORDER — PREDNISONE 50 MG/1
TABLET ORAL
Qty: 3 TABLET | Refills: 0 | Status: SHIPPED | OUTPATIENT
Start: 2024-06-03 | End: 2024-06-10 | Stop reason: SDUPTHER

## 2024-06-07 ENCOUNTER — APPOINTMENT (OUTPATIENT)
Dept: RADIOLOGY | Facility: HOSPITAL | Age: 74
End: 2024-06-07
Payer: MEDICARE

## 2024-06-09 NOTE — PROGRESS NOTES
Chief Complaint   Patient presents with    Right Shoulder - Follow-up     Rotator cuff tear, s/p Nabumetone     History of Present Illness:   She had a right shoulder mini open rotator cuff repair of subscapularis and supraspinatus, biceps tenodesis on 4/26/2017. She has a nerve stimulator and cannot get an MRI.     Patient had a fall a few months ago and had persistent weakness.  She has been treated conservatively with oral steroid as well as an nabumetone.  Unfortunately she has significant persistent weakness.  She feels like she cannot use it for normal daily activities.  States her prior to her fall was excellent strength and function.  This is dramatically different.  Home exercise program is not improving.   Her CT arthrogram was canceled.  She does not know why.  She is fairly frustrated with her inability to obtain additional imaging  Imaging:  Shoulder: No acute fractures dislocations.  No arthritic change.     Assessment:   Right shoulder rotator cuff tear    Plan:  We reviewed the role of imaging, physical therapy, injections and the time frame to healing and correlation with outcome.  1.  Will coordinate a CT arthrogram with contrast. Will call in oral steroid beforhand. Her allergy was to IV dye when she was a child. She is also advised to take Benadryl 1 hour before CT arthrogram.  There was a scheduling error and her CT was cancelled. We are looking into it. We discussed definitive surgery based on the imaging. She will follow up after CT arthrogram.   2.  NSAID: Nabumetone.  GI side effects and medical risks discussed  3.  Ice: 60 minutes on and off     Physical Exam:  Well-nourished, well-developed. No acute distress. Alert and oriented x3. Responds appropriately to questioning. Good mood. Normal affect.  Physical Exam  Right shoulder:  Skin healthy to gross inspection. No ecchymosis, no swelling, no gross atrophy  No tenderness to palpation over acromioclavicular joint  Mild pain in the  biceps  ROM: 130 forward flexion  Strength: 4.5/5 Resisted elevation, 4/5 external rotation, unable to forward flex actively beyond 100 degrees  Pain with lift off and Speeds test + impingement  Negative Spurling´s test  Positive Neer and Hawkin´s test  Neurovascular exam normal distally     Review of Systems:  GENERAL: Negative for malaise, significant weight loss, fever  MUSCULOSKELETAL: See HPI  NEURO:  Negative     Past Medical History:   Diagnosis Date    Acute candidiasis of vulva and vagina 09/04/2015    Vaginal candidiasis    Cervicalgia 04/13/2016    Neck pain    Disorientation, unspecified 08/10/2015    Episodic confusion    Encounter for other screening for malignant neoplasm of breast 09/04/2015    Screening for breast cancer    Impacted cerumen, left ear 01/11/2016    Impacted cerumen of left ear    Insect bite (nonvenomous) of lower back and pelvis, initial encounter 09/04/2015    Tick bite of back    Lyme disease, unspecified 09/04/2015    Erythema chronicum migrans    Other acute postprocedural pain 03/30/2022    Post-op pain    Other symptoms and signs involving the musculoskeletal system 06/29/2015    Weakness of hand    Personal history of diseases of the skin and subcutaneous tissue 01/11/2016    History of seborrhea    Personal history of other diseases of the circulatory system 03/05/2015    History of hypertension    Personal history of other diseases of the circulatory system     History of carotid artery stenosis    Personal history of other diseases of the musculoskeletal system and connective tissue 09/25/2015    History of backache    Personal history of other diseases of the respiratory system 07/01/2016    History of acute bronchitis    Personal history of other mental and behavioral disorders 04/06/2016    History of depression    Personal history of other specified conditions 09/25/2015    History of urinary frequency    Personal history of other specified conditions 08/30/2015     History of headache    Unspecified condition associated with female genital organs and menstrual cycle 01/11/2016    Vaginal burning    Urinary tract infection, site not specified 09/25/2015    Acute UTI    Urinary tract infection, site not specified 09/25/2015    Acute lower UTI    Urinary tract infection, site not specified 01/11/2016    UTI (lower urinary tract infection)       Medication Documentation Review Audit       Reviewed by Jackeline Avila MA (Medical Assistant) on 05/06/24 at 1333      Medication Order Taking? Sig Documenting Provider Last Dose Status   albuterol 90 mcg/actuation inhaler [id number] No Inhale 2 puffs every 6 hours if needed for wheezing. Historical Provider, MD Taking Active   amLODIPine (Norvasc) 5 mg tablet [id number] No Take 1 tablet (5 mg) by mouth once daily. Historical Provider, MD Taking Active   atorvastatin (Lipitor) 40 mg tablet [id number] No Take 1 tablet (40 mg) by mouth once daily. Historical Provider, MD Taking Active   busPIRone (Buspar) 7.5 mg tablet [id number] No Take 1 tablet (7.5 mg) by mouth every 6 hours during the day. Historical MD Ashish Taking Active   calcium carbonate 600 mg calcium (1,500 mg) tablet [id number] No Take 1 tablet (600 mg) by mouth once daily. Historical Provider, MD Taking Active   cholecalciferol (Vitamin D-3) 50 MCG (2000 UT) tablet [id number] No Take 1 tablet (50 mcg) by mouth once daily. Historical Provider, MD Taking Active   clopidogrel (Plavix) 75 mg tablet [id number] No Take 1 tablet (75 mg) by mouth once daily. Historical MD Ashish Taking Active   cyclobenzaprine (Flexeril) 5 mg tablet [id number] No Take 1 tablet (5 mg) by mouth as needed at bedtime for muscle spasms. Historical MD Ashish Not Taking Active   glucosamine/chondr lopez A sod (glucosamine-chondroitin) 1,500-1,200 mg/30 mL liquid [id number] No Take by mouth. Use PO as directed Historical MD Ashish Taking Active   levothyroxine (Synthroid, Levoxyl) 88 mcg tablet [id number] No Take 1 tablet  (88 mcg) by mouth once daily. Historical Provider, MD Taking Active   magnesium oxide 500 mg tablet [id number] No Take 1 tablet (500 mg) by mouth once daily. Historical Provider, MD Taking Active   methylPREDNISolone (Medrol Dospak) 4 mg tablets [id number]  Follow schedule on package instructions Khari Ocampo MD  Active   nabumetone (Relafen) 750 mg tablet [id number]  Take 1 tablet (750 mg) by mouth 2 times a day. Marty Hayes MD  Active   omeprazole (PriLOSEC) 40 mg DR capsule [id number] No Take 1 capsule (40 mg) by mouth once daily. Historical Provider, MD Not Taking Active   traZODone (Desyrel) 150 mg tablet [id number] No Take 2 tablets (300 mg) by mouth once daily at bedtime. Historical Provider, MD Taking Active   vilazodone (Viibryd) 40 mg tablet [id number] No Take 1 tablet (40 mg) by mouth once daily. Historical Provider, MD Taking Active   vitamin B complex tablet [id number] No Take 1 tablet by mouth once daily. Historical Provider, MD Taking Active   zaleplon (Sonata) 5 mg capsule [id number] No Take 1 capsule (5 mg) by mouth. Historical Provider, MD Taking Active                    Allergies   Allergen Reactions    Colesevelam Other    Diazepam Unknown    Duloxetine Seizure    Fluvastatin Unknown    Iodides Unknown    Iodinated Contrast Media Unknown    Iodine Unknown    Latex Unknown    Morphine Unknown    Other Unknown     ADHESIVE BANDAGES    Pravastatin Unknown       Social History     Socioeconomic History    Marital status:      Spouse name: Not on file    Number of children: Not on file    Years of education: Not on file    Highest education level: Not on file   Occupational History    Not on file   Tobacco Use    Smoking status: Former     Types: Cigarettes    Smokeless tobacco: Never   Substance and Sexual Activity    Alcohol use: Never    Drug use: Not on file    Sexual activity: Not on file   Other Topics Concern    Not on file   Social History Narrative    Not on file     Social Determinants  of Health     Financial Resource Strain: Not on file   Food Insecurity: Not on file   Transportation Needs: Not on file   Physical Activity: Not on file   Stress: Not on file   Social Connections: Not on file   Intimate Partner Violence: Not on file   Housing Stability: Not on file       Past Surgical History:   Procedure Laterality Date    APPENDECTOMY  04/07/2015    Appendectomy    BLADDER SURGERY  04/07/2015    Bladder Surgery    BREAST BIOPSY  12/02/2016    Biopsy Breast Percutaneous Needle Core    GALLBLADDER SURGERY  04/07/2015    Gallbladder Surgery    HERNIA REPAIR  04/07/2015    Hernia Repair    HYSTERECTOMY  04/07/2015    Hysterectomy    INCISIONAL BREAST BIOPSY  12/02/2016    Incisional Breast Biopsy    OTHER SURGICAL HISTORY  03/05/2015    Install Vagal Nerve Neurostimulator By Incision With Pulse Generator    OTHER SURGICAL HISTORY  12/02/2016    Salpingo-oophorectomy    OTHER SURGICAL HISTORY  04/07/2015    Breast Surgery Reduction Procedure Elective    OTHER SURGICAL HISTORY  07/13/2022    Cataract surgery    OTHER SURGICAL HISTORY  07/13/2022    Cardiac catheterization    OTHER SURGICAL HISTORY  07/13/2022    Arm surgery    OTHER SURGICAL HISTORY  07/13/2022    Knee surgery    OTHER SURGICAL HISTORY  07/13/2022    Colonoscopy    OTHER SURGICAL HISTORY  07/13/2022    Rotator cuff repair    OTHER SURGICAL HISTORY  07/13/2022    Surgery    TOTAL KNEE ARTHROPLASTY  04/07/2015    Knee Replacement       No results found.                           Scribe Attestation  By signing my name below, I, Matthew Coleman   attest that this documentation has been prepared under the direction and in the presence of Khari Ocampo MD.

## 2024-06-10 ENCOUNTER — OFFICE VISIT (OUTPATIENT)
Dept: ORTHOPEDIC SURGERY | Facility: CLINIC | Age: 74
End: 2024-06-10
Payer: MEDICARE

## 2024-06-10 DIAGNOSIS — M75.41 IMPINGEMENT SYNDROME OF RIGHT SHOULDER: Primary | ICD-10-CM

## 2024-06-10 DIAGNOSIS — M75.41 IMPINGEMENT SYNDROME OF RIGHT SHOULDER: ICD-10-CM

## 2024-06-10 PROCEDURE — 99213 OFFICE O/P EST LOW 20 MIN: CPT | Performed by: ORTHOPAEDIC SURGERY

## 2024-06-10 PROCEDURE — 1157F ADVNC CARE PLAN IN RCRD: CPT | Performed by: ORTHOPAEDIC SURGERY

## 2024-06-10 PROCEDURE — 99214 OFFICE O/P EST MOD 30 MIN: CPT | Performed by: ORTHOPAEDIC SURGERY

## 2024-06-10 RX ORDER — PREDNISONE 50 MG/1
TABLET ORAL
Qty: 3 TABLET | Refills: 0 | Status: SHIPPED | OUTPATIENT
Start: 2024-06-10

## 2024-06-11 DIAGNOSIS — I65.23 BILATERAL CAROTID ARTERY STENOSIS: ICD-10-CM

## 2024-06-17 DIAGNOSIS — Z96.653 STATUS POST BILATERAL KNEE REPLACEMENTS: ICD-10-CM

## 2024-06-17 RX ORDER — NABUMETONE 750 MG/1
750 TABLET, FILM COATED ORAL 2 TIMES DAILY
Qty: 90 TABLET | Refills: 0 | Status: SHIPPED | OUTPATIENT
Start: 2024-06-17

## 2024-07-03 ENCOUNTER — APPOINTMENT (OUTPATIENT)
Dept: RADIOLOGY | Facility: HOSPITAL | Age: 74
End: 2024-07-03
Payer: MEDICARE

## 2024-07-11 ENCOUNTER — HOSPITAL ENCOUNTER (OUTPATIENT)
Dept: RADIOLOGY | Facility: HOSPITAL | Age: 74
Discharge: HOME | End: 2024-07-11
Payer: MEDICARE

## 2024-07-11 DIAGNOSIS — M25.511 RIGHT SHOULDER PAIN, UNSPECIFIED CHRONICITY: ICD-10-CM

## 2024-07-11 DIAGNOSIS — M75.41 IMPINGEMENT SYNDROME OF RIGHT SHOULDER: ICD-10-CM

## 2024-07-11 PROCEDURE — 73040 CONTRAST X-RAY OF SHOULDER: CPT | Mod: RT

## 2024-07-11 PROCEDURE — 2550000001 HC RX 255 CONTRASTS: Performed by: ORTHOPAEDIC SURGERY

## 2024-07-11 PROCEDURE — 77002 NEEDLE LOCALIZATION BY XRAY: CPT | Mod: RIGHT SIDE | Performed by: RADIOLOGY

## 2024-07-11 PROCEDURE — 23350 INJECTION FOR SHOULDER X-RAY: CPT | Mod: RIGHT SIDE | Performed by: RADIOLOGY

## 2024-07-11 PROCEDURE — 73201 CT UPPER EXTREMITY W/DYE: CPT | Mod: RT

## 2024-07-11 PROCEDURE — 2500000005 HC RX 250 GENERAL PHARMACY W/O HCPCS: Performed by: ORTHOPAEDIC SURGERY

## 2024-07-11 RX ORDER — LIDOCAINE HYDROCHLORIDE 20 MG/ML
INJECTION, SOLUTION EPIDURAL; INFILTRATION; INTRACAUDAL; PERINEURAL AS NEEDED
Status: COMPLETED | OUTPATIENT
Start: 2024-07-11 | End: 2024-07-11

## 2024-07-18 NOTE — PROGRESS NOTES
Chief Complaint   Patient presents with    Right Shoulder - Follow-up     CT review      History of Present Illness:   She had a right shoulder mini open rotator cuff repair of subscapularis and supraspinatus, biceps tenodesis on 4/26/2017. She has a nerve stimulator and cannot get an MRI.     Patient had a fall in March and had persistent weakness.  She has been treated conservatively with oral steroid as well as an nabumetone.  Unfortunately she has significant persistent weakness.  She feels like she cannot use it for normal daily activities.  States her prior to her fall was excellent strength and function.  This is dramatically different.  Home exercise program is not improving. Her CT arthrogram was canceled.  She does not know why.  She is fairly frustrated with her inability to obtain additional imaging    Imaging:  Shoulder: No acute fractures dislocations.  No arthritic change.    CT arthrogram right shoulder: Shows full thickness tear in the supraspinatus as well as the edge of the subscapularis.     Assessment:   Right shoulder recurrent rotator cuff tear    Plan:  We reviewed the role of imaging, physical therapy, injections and the time frame to healing and correlation with outcome.   NSAID: Nabumetone.  GI side effects and medical risks discussed  Ice: 60 minutes on and off  Recommended right shoulder rotator cuff repair revision surgery    Right shoulder arthroscopic rotator cuff repair revision  Risks benefits and alternatives to surgery were discussed including but not limited to infection, bleeding, neurovascular injury, pain and dysfunction, hardware related complications including cutout failure breakage, loss of function, motion, and permanent disability as well as the cardiovascular and pulmonary complications from anesthesia including death and DVT. Patient and family accept these risks. We discussed specifically hardware complications including cut out, failure, breakage, incomplete  repair, retear, intraoperative decisions regarding the biceps and labral pathology, possible need for open repair, possible need for allograft augmentation as well as potential retear and revision including revision repair or arthroplasty.  We will plan for outpatient surgery and one week postop follow up   Percocet for postop pain relief. [place] has been reviewed and is consistent with prescribed medications. This report is scanned into the electronic medical record. The risks of abuse, dependence, addiction and diversion were considered. The medication is felt to be clinically appropriate based on documented diagnosis.        Physical Exam:  Well-nourished, well-developed. No acute distress. Alert and oriented x3. Responds appropriately to questioning. Good mood. Normal affect.  Physical Exam  Head/neck: Atraumatic, normocephalic  Cardiovascular: Palpable pulse regular to extremities  Pulmonary: Respirations regular, no audible wheeze  Abdomen: Soft, nondistended  Right shoulder:  Skin healthy to gross inspection. No ecchymosis, no swelling, no gross atrophy  No tenderness to palpation over acromioclavicular joint  Mild pain in the biceps  ROM: 130 forward flexion  Strength: 4.5/5 Resisted elevation, 4/5 external rotation, unable to forward flex actively beyond 100 degrees  Pain with lift off and Speeds test + impingement  Negative Spurling´s test  Positive Neer and Hawkin´s test  Neurovascular exam normal distally     Review of Systems:  GENERAL: Negative for malaise, significant weight loss, fever  MUSCULOSKELETAL: See HPI  NEURO:  Negative     Past Medical History:   Diagnosis Date    Acute candidiasis of vulva and vagina 09/04/2015    Vaginal candidiasis    Cervicalgia 04/13/2016    Neck pain    Disorientation, unspecified 08/10/2015    Episodic confusion    Encounter for other screening for malignant neoplasm of breast 09/04/2015    Screening for breast cancer    Impacted cerumen, left ear 01/11/2016     Impacted cerumen of left ear    Insect bite (nonvenomous) of lower back and pelvis, initial encounter 09/04/2015    Tick bite of back    Lyme disease, unspecified 09/04/2015    Erythema chronicum migrans    Other acute postprocedural pain 03/30/2022    Post-op pain    Other symptoms and signs involving the musculoskeletal system 06/29/2015    Weakness of hand    Personal history of diseases of the skin and subcutaneous tissue 01/11/2016    History of seborrhea    Personal history of other diseases of the circulatory system 03/05/2015    History of hypertension    Personal history of other diseases of the circulatory system     History of carotid artery stenosis    Personal history of other diseases of the musculoskeletal system and connective tissue 09/25/2015    History of backache    Personal history of other diseases of the respiratory system 07/01/2016    History of acute bronchitis    Personal history of other mental and behavioral disorders 04/06/2016    History of depression    Personal history of other specified conditions 09/25/2015    History of urinary frequency    Personal history of other specified conditions 08/30/2015    History of headache    Unspecified condition associated with female genital organs and menstrual cycle 01/11/2016    Vaginal burning    Urinary tract infection, site not specified 09/25/2015    Acute UTI    Urinary tract infection, site not specified 09/25/2015    Acute lower UTI    Urinary tract infection, site not specified 01/11/2016    UTI (lower urinary tract infection)       Medication Documentation Review Audit       Reviewed by Becky Bustamante MA (Medical Assistant) on 07/19/24 at 1029      Medication Order Taking? Sig Documenting Provider Last Dose Status   albuterol 90 mcg/actuation inhaler [id number] No Inhale 2 puffs every 6 hours if needed for wheezing. Historical Provider, MD Taking Active   amLODIPine (Norvasc) 5 mg tablet [id number] No Take 1 tablet (5 mg) by mouth once  daily. Greta Hinojosa MD Taking Active   atorvastatin (Lipitor) 40 mg tablet [id number] No Take 1 tablet (40 mg) by mouth once daily. Greta Hinojosa MD Taking Active   busPIRone (Buspar) 7.5 mg tablet [id number] No Take 1 tablet (7.5 mg) by mouth every 6 hours during the day. Greta Hinojosa MD Taking Active   calcium carbonate 600 mg calcium (1,500 mg) tablet [id number] No Take 1 tablet (600 mg) by mouth once daily. Greta Hinojosa MD Taking Active   cholecalciferol (Vitamin D-3) 50 MCG (2000 UT) tablet [id number] No Take 1 tablet (50 mcg) by mouth once daily. Greta Hinojosa MD Taking Active   clopidogrel (Plavix) 75 mg tablet [id number] No Take 1 tablet (75 mg) by mouth once daily. Greta Hinojosa MD Taking Active   cyclobenzaprine (Flexeril) 5 mg tablet [id number] No Take 1 tablet (5 mg) by mouth as needed at bedtime for muscle spasms. Greta Hinojosa MD Not Taking Active   glucosamine/chondr lopez A sod (glucosamine-chondroitin) 1,500-1,200 mg/30 mL liquid [id number] No Take by mouth. Use PO as directed Greta Hinojosa MD Taking Active   levothyroxine (Synthroid, Levoxyl) 88 mcg tablet [id number] No Take 1 tablet (88 mcg) by mouth once daily. Greta Hinojosa MD Taking Active   magnesium oxide 500 mg tablet [id number] No Take 1 tablet (500 mg) by mouth once daily. Greta Hinojosa MD Taking Active   methylPREDNISolone (Medrol Dospak) 4 mg tablets [id number]  Follow schedule on package instructions Khari Ocampo MD  Active   nabumetone (Relafen) 750 mg tablet [id number]  Take 1 tablet by mouth twice daily Marty Hayes MD  Active   omeprazole (PriLOSEC) 40 mg DR capsule [id number] No Take 1 capsule (40 mg) by mouth once daily. Greta Hinojosa MD Not Taking Active   predniSONE (Deltasone) 10 mg tablet [id number]  TAKE 1 TAB 24 HOURS PRIOR TO PROCEDURE  TAKE 1 TAB 12 HOURS PRIOR TO PROCEDURE  TAKE 1 TAB 1 HOUR PRIOR TO PROCEDURE Khari Ocampo MD  Active   predniSONE  (Deltasone) 50 mg tablet [id number]  Take 1 tab 24 hours prior to procedure  Take 1 tab 12 hours prior to procedure  Take 1 tab 1 hour prior to procedure Khari Ocampo MD  Active   predniSONE (Deltasone) 50 mg tablet [id number]  TAKE 1 TAB 24 HOURS PRIOR TO PROCEDURE  TAKE 1 TAB 12 HOURS PRIOR TO PROCEDURE  TAKE 1 TAB 1 HOUR PRIOR TO PROCEDURE Khari Ocampo MD  Active   traZODone (Desyrel) 150 mg tablet [id number] No Take 2 tablets (300 mg) by mouth once daily at bedtime. Historical Provider, MD Taking Active   vilazodone (Viibryd) 40 mg tablet [id number] No Take 1 tablet (40 mg) by mouth once daily. Historical Provider, MD Taking Active   vitamin B complex tablet [id number] No Take 1 tablet by mouth once daily. Historical Provider, MD Taking Active   zaleplon (Sonata) 5 mg capsule [id number] No Take 1 capsule (5 mg) by mouth. Historical Provider, MD Taking Active                    Allergies   Allergen Reactions    Colesevelam Other    Diazepam Unknown    Duloxetine Seizure    Fluvastatin Unknown    Iodides Unknown    Iodinated Contrast Media Unknown    Iodine Unknown    Latex Unknown    Morphine Unknown    Other Unknown     ADHESIVE BANDAGES    Pravastatin Unknown       Social History     Socioeconomic History    Marital status:      Spouse name: Not on file    Number of children: Not on file    Years of education: Not on file    Highest education level: Not on file   Occupational History    Not on file   Tobacco Use    Smoking status: Former     Types: Cigarettes    Smokeless tobacco: Never   Substance and Sexual Activity    Alcohol use: Never    Drug use: Not on file    Sexual activity: Not on file   Other Topics Concern    Not on file   Social History Narrative    Not on file     Social Determinants of Health     Financial Resource Strain: Not on file   Food Insecurity: Not on file   Transportation Needs: Not on file   Physical Activity: Not on file   Stress: Not on file   Social Connections: Not on file    Intimate Partner Violence: Not on file   Housing Stability: Not on file       Past Surgical History:   Procedure Laterality Date    APPENDECTOMY  04/07/2015    Appendectomy    BLADDER SURGERY  04/07/2015    Bladder Surgery    BREAST BIOPSY  12/02/2016    Biopsy Breast Percutaneous Needle Core    GALLBLADDER SURGERY  04/07/2015    Gallbladder Surgery    HERNIA REPAIR  04/07/2015    Hernia Repair    HYSTERECTOMY  04/07/2015    Hysterectomy    INCISIONAL BREAST BIOPSY  12/02/2016    Incisional Breast Biopsy    OTHER SURGICAL HISTORY  03/05/2015    Install Vagal Nerve Neurostimulator By Incision With Pulse Generator    OTHER SURGICAL HISTORY  12/02/2016    Salpingo-oophorectomy    OTHER SURGICAL HISTORY  04/07/2015    Breast Surgery Reduction Procedure Elective    OTHER SURGICAL HISTORY  07/13/2022    Cataract surgery    OTHER SURGICAL HISTORY  07/13/2022    Cardiac catheterization    OTHER SURGICAL HISTORY  07/13/2022    Arm surgery    OTHER SURGICAL HISTORY  07/13/2022    Knee surgery    OTHER SURGICAL HISTORY  07/13/2022    Colonoscopy    OTHER SURGICAL HISTORY  07/13/2022    Rotator cuff repair    OTHER SURGICAL HISTORY  07/13/2022    Surgery    TOTAL KNEE ARTHROPLASTY  04/07/2015    Knee Replacement       No results found.                           Scribe Attestation  By signing my name below, IFidelia Scribe   attest that this documentation has been prepared under the direction and in the presence of Khari Ocampo MD.

## 2024-07-19 ENCOUNTER — OFFICE VISIT (OUTPATIENT)
Dept: ORTHOPEDIC SURGERY | Facility: CLINIC | Age: 74
End: 2024-07-19
Payer: MEDICARE

## 2024-07-19 DIAGNOSIS — S46.011D TRAUMATIC COMPLETE TEAR OF RIGHT ROTATOR CUFF, SUBSEQUENT ENCOUNTER: Primary | ICD-10-CM

## 2024-07-19 PROCEDURE — 99214 OFFICE O/P EST MOD 30 MIN: CPT | Performed by: ORTHOPAEDIC SURGERY

## 2024-07-24 ENCOUNTER — TELEPHONE (OUTPATIENT)
Dept: ORTHOPEDIC SURGERY | Facility: CLINIC | Age: 74
End: 2024-07-24
Payer: MEDICARE

## 2024-07-24 DIAGNOSIS — Z96.653 STATUS POST BILATERAL KNEE REPLACEMENTS: ICD-10-CM

## 2024-07-24 NOTE — TELEPHONE ENCOUNTER
07/24/24  LVM for pt to contact our office and schedule fitting appt for  shoulder sling w/ abduction pillow needed for post-op use.

## 2024-07-25 ENCOUNTER — APPOINTMENT (OUTPATIENT)
Dept: ORTHOPEDIC SURGERY | Facility: CLINIC | Age: 74
End: 2024-07-25
Payer: MEDICARE

## 2024-07-25 ENCOUNTER — TELEPHONE (OUTPATIENT)
Dept: ORTHOPEDIC SURGERY | Facility: CLINIC | Age: 74
End: 2024-07-25
Payer: MEDICARE

## 2024-07-25 ENCOUNTER — TELEPHONE (OUTPATIENT)
Dept: CARDIOLOGY | Facility: CLINIC | Age: 74
End: 2024-07-25
Payer: MEDICARE

## 2024-07-25 NOTE — TELEPHONE ENCOUNTER
Per Dr. Rasheed Jurado patient is an acceptable risk at this time for rt. Shoulder rotator cuff repair.  POC faxed to [phone].

## 2024-07-26 RX ORDER — NABUMETONE 750 MG/1
750 TABLET, FILM COATED ORAL 2 TIMES DAILY
Qty: 90 TABLET | Refills: 0 | Status: SHIPPED | OUTPATIENT
Start: 2024-07-26

## 2024-08-12 DIAGNOSIS — S46.011D TRAUMATIC COMPLETE TEAR OF RIGHT ROTATOR CUFF, SUBSEQUENT ENCOUNTER: Primary | ICD-10-CM

## 2024-08-12 RX ORDER — OXYCODONE AND ACETAMINOPHEN 5; 325 MG/1; MG/1
1 TABLET ORAL EVERY 6 HOURS PRN
Qty: 20 TABLET | Refills: 0 | Status: SHIPPED | OUTPATIENT
Start: 2024-08-13 | End: 2024-08-13 | Stop reason: SINTOL

## 2024-08-13 ENCOUNTER — TELEPHONE (OUTPATIENT)
Dept: ORTHOPEDIC SURGERY | Facility: CLINIC | Age: 74
End: 2024-08-13

## 2024-08-13 ENCOUNTER — LAB (OUTPATIENT)
Dept: LAB | Facility: LAB | Age: 74
End: 2024-08-13
Payer: MEDICARE

## 2024-08-13 DIAGNOSIS — M25.511 RIGHT SHOULDER PAIN, UNSPECIFIED CHRONICITY: ICD-10-CM

## 2024-08-13 DIAGNOSIS — M25.511 RIGHT SHOULDER PAIN, UNSPECIFIED CHRONICITY: Primary | ICD-10-CM

## 2024-08-13 DIAGNOSIS — M75.41 IMPINGEMENT SYNDROME OF RIGHT SHOULDER: ICD-10-CM

## 2024-08-13 DIAGNOSIS — Z01.818 PRE-OP TESTING: ICD-10-CM

## 2024-08-13 LAB
ALBUMIN SERPL BCP-MCNC: 4 G/DL (ref 3.4–5)
ALP SERPL-CCNC: 62 U/L (ref 33–136)
ALT SERPL W P-5'-P-CCNC: 13 U/L (ref 7–45)
ANION GAP SERPL CALC-SCNC: 13 MMOL/L (ref 10–20)
APTT PPP: 27 SECONDS (ref 27–38)
AST SERPL W P-5'-P-CCNC: 19 U/L (ref 9–39)
BASOPHILS # BLD AUTO: 0.05 X10*3/UL (ref 0–0.1)
BASOPHILS NFR BLD AUTO: 0.7 %
BILIRUB SERPL-MCNC: 0.5 MG/DL (ref 0–1.2)
BUN SERPL-MCNC: 20 MG/DL (ref 6–23)
CALCIUM SERPL-MCNC: 9.3 MG/DL (ref 8.6–10.3)
CHLORIDE SERPL-SCNC: 107 MMOL/L (ref 98–107)
CO2 SERPL-SCNC: 27 MMOL/L (ref 21–32)
CREAT SERPL-MCNC: 1.2 MG/DL (ref 0.5–1.05)
EGFRCR SERPLBLD CKD-EPI 2021: 48 ML/MIN/1.73M*2
EOSINOPHIL # BLD AUTO: 0.21 X10*3/UL (ref 0–0.4)
EOSINOPHIL NFR BLD AUTO: 2.9 %
ERYTHROCYTE [DISTWIDTH] IN BLOOD BY AUTOMATED COUNT: 12.8 % (ref 11.5–14.5)
GLUCOSE SERPL-MCNC: 117 MG/DL (ref 74–99)
HCT VFR BLD AUTO: 38.9 % (ref 36–46)
HGB BLD-MCNC: 13.3 G/DL (ref 12–16)
IMM GRANULOCYTES # BLD AUTO: 0.02 X10*3/UL (ref 0–0.5)
IMM GRANULOCYTES NFR BLD AUTO: 0.3 % (ref 0–0.9)
INR PPP: 0.9 (ref 0.9–1.1)
LYMPHOCYTES # BLD AUTO: 1.85 X10*3/UL (ref 0.8–3)
LYMPHOCYTES NFR BLD AUTO: 25.3 %
MCH RBC QN AUTO: 33.8 PG (ref 26–34)
MCHC RBC AUTO-ENTMCNC: 34.2 G/DL (ref 32–36)
MCV RBC AUTO: 99 FL (ref 80–100)
MONOCYTES # BLD AUTO: 0.59 X10*3/UL (ref 0.05–0.8)
MONOCYTES NFR BLD AUTO: 8.1 %
NEUTROPHILS # BLD AUTO: 4.6 X10*3/UL (ref 1.6–5.5)
NEUTROPHILS NFR BLD AUTO: 62.7 %
NRBC BLD-RTO: 0 /100 WBCS (ref 0–0)
PLATELET # BLD AUTO: 207 X10*3/UL (ref 150–450)
POTASSIUM SERPL-SCNC: 4.8 MMOL/L (ref 3.5–5.3)
PROT SERPL-MCNC: 6.5 G/DL (ref 6.4–8.2)
PROTHROMBIN TIME: 10.1 SECONDS (ref 9.8–12.8)
RBC # BLD AUTO: 3.93 X10*6/UL (ref 4–5.2)
SODIUM SERPL-SCNC: 142 MMOL/L (ref 136–145)
WBC # BLD AUTO: 7.3 X10*3/UL (ref 4.4–11.3)

## 2024-08-13 PROCEDURE — 80053 COMPREHEN METABOLIC PANEL: CPT

## 2024-08-13 PROCEDURE — 29826 SHO ARTHRS SRG DECOMPRESSION: CPT | Performed by: PHYSICIAN ASSISTANT

## 2024-08-13 PROCEDURE — 29827 SHO ARTHRS SRG RT8TR CUF RPR: CPT | Performed by: PHYSICIAN ASSISTANT

## 2024-08-13 PROCEDURE — 85025 COMPLETE CBC W/AUTO DIFF WBC: CPT

## 2024-08-13 PROCEDURE — 85610 PROTHROMBIN TIME: CPT

## 2024-08-13 PROCEDURE — 36415 COLL VENOUS BLD VENIPUNCTURE: CPT

## 2024-08-13 PROCEDURE — 29825 SHO ARTHRS SRG LSS&RESCJ ADS: CPT | Performed by: PHYSICIAN ASSISTANT

## 2024-08-13 PROCEDURE — 85730 THROMBOPLASTIN TIME PARTIAL: CPT

## 2024-08-13 RX ORDER — TRAMADOL HYDROCHLORIDE 50 MG/1
50 TABLET ORAL EVERY 6 HOURS PRN
Qty: 28 TABLET | Refills: 0 | Status: SHIPPED | OUTPATIENT
Start: 2024-08-13 | End: 2024-08-14 | Stop reason: SDUPTHER

## 2024-08-13 NOTE — TELEPHONE ENCOUNTER
Caller [facility] pharmacy, asking if this rx tramadol 50 mg can be changed from 1 tablet every 6 hours to 8 hours and quantity from 28 to 21 tablets because the MG is too high.

## 2024-08-14 RX ORDER — TRAMADOL HYDROCHLORIDE 50 MG/1
50 TABLET ORAL EVERY 8 HOURS PRN
Qty: 21 TABLET | Refills: 0 | Status: SHIPPED | OUTPATIENT
Start: 2024-08-14 | End: 2024-08-21

## 2024-08-15 ENCOUNTER — TELEPHONE (OUTPATIENT)
Dept: ORTHOPEDIC SURGERY | Facility: CLINIC | Age: 74
End: 2024-08-15
Payer: MEDICARE

## 2024-08-23 ENCOUNTER — OFFICE VISIT (OUTPATIENT)
Dept: ORTHOPEDIC SURGERY | Facility: CLINIC | Age: 74
End: 2024-08-23
Payer: MEDICARE

## 2024-08-23 DIAGNOSIS — S46.011D TRAUMATIC COMPLETE TEAR OF RIGHT ROTATOR CUFF, SUBSEQUENT ENCOUNTER: ICD-10-CM

## 2024-08-23 PROCEDURE — 99211 OFF/OP EST MAY X REQ PHY/QHP: CPT | Performed by: PHYSICIAN ASSISTANT

## 2024-08-23 NOTE — PROGRESS NOTES
History of Present Illness:  Date of Surgery: 8/16/2024.  Right shoulder scope with revision rotator cuff repair.  Overall patient states that shoulder is improving, no complaints today.  She has been wearing the sling.    Exam:  Mild effusion  Right shoulder incisions clean dry intact healing well without drainage  Right shoulder range of motion intentionally not tested today  No calf swelling   Negative Roxie's test  Distal neurovascular exam intact    Assessment:  Patient status post right shoulder scope with rotator cuff repair    Plan:  Reviewed arthroscopic photos and findings  Remain in sling until 6 weeks postop.  At 6 weeks postop can begin physical therapy in [place]  Follow-up in 4 weeks

## 2024-09-09 ENCOUNTER — ANCILLARY PROCEDURE (OUTPATIENT)
Dept: CARDIOLOGY | Facility: HOSPITAL | Age: 74
End: 2024-09-09
Payer: MEDICARE

## 2024-09-09 ENCOUNTER — LAB (OUTPATIENT)
Dept: LAB | Facility: LAB | Age: 74
End: 2024-09-09
Payer: MEDICARE

## 2024-09-09 DIAGNOSIS — E78.5 HYPERLIPIDEMIA, UNSPECIFIED: Primary | ICD-10-CM

## 2024-09-09 DIAGNOSIS — I10 ESSENTIAL (PRIMARY) HYPERTENSION: ICD-10-CM

## 2024-09-09 DIAGNOSIS — I65.23 BILATERAL CAROTID ARTERY STENOSIS: ICD-10-CM

## 2024-09-09 LAB
ALBUMIN SERPL BCP-MCNC: 4.1 G/DL (ref 3.4–5)
ALP SERPL-CCNC: 78 U/L (ref 33–136)
ALT SERPL W P-5'-P-CCNC: 13 U/L (ref 7–45)
ANION GAP SERPL CALC-SCNC: 13 MMOL/L (ref 10–20)
AST SERPL W P-5'-P-CCNC: 18 U/L (ref 9–39)
BILIRUB SERPL-MCNC: 0.6 MG/DL (ref 0–1.2)
BUN SERPL-MCNC: 15 MG/DL (ref 6–23)
CALCIUM SERPL-MCNC: 9.7 MG/DL (ref 8.6–10.3)
CHLORIDE SERPL-SCNC: 104 MMOL/L (ref 98–107)
CHOLEST SERPL-MCNC: 158 MG/DL (ref 0–199)
CHOLESTEROL/HDL RATIO: 2.4
CO2 SERPL-SCNC: 29 MMOL/L (ref 21–32)
CREAT SERPL-MCNC: 1.12 MG/DL (ref 0.5–1.05)
EGFRCR SERPLBLD CKD-EPI 2021: 52 ML/MIN/1.73M*2
ERYTHROCYTE [DISTWIDTH] IN BLOOD BY AUTOMATED COUNT: 12.6 % (ref 11.5–14.5)
GLUCOSE SERPL-MCNC: 109 MG/DL (ref 74–99)
HCT VFR BLD AUTO: 37.6 % (ref 36–46)
HDLC SERPL-MCNC: 66.6 MG/DL
HGB BLD-MCNC: 12.6 G/DL (ref 12–16)
LDLC SERPL CALC-MCNC: 68 MG/DL
MCH RBC QN AUTO: 33.3 PG (ref 26–34)
MCHC RBC AUTO-ENTMCNC: 33.5 G/DL (ref 32–36)
MCV RBC AUTO: 100 FL (ref 80–100)
NON HDL CHOLESTEROL: 91 MG/DL (ref 0–149)
NRBC BLD-RTO: 0 /100 WBCS (ref 0–0)
PLATELET # BLD AUTO: 239 X10*3/UL (ref 150–450)
POTASSIUM SERPL-SCNC: 4.9 MMOL/L (ref 3.5–5.3)
PROT SERPL-MCNC: 6.3 G/DL (ref 6.4–8.2)
RBC # BLD AUTO: 3.78 X10*6/UL (ref 4–5.2)
SODIUM SERPL-SCNC: 141 MMOL/L (ref 136–145)
TRIGL SERPL-MCNC: 118 MG/DL (ref 0–149)
VLDL: 24 MG/DL (ref 0–40)
WBC # BLD AUTO: 7.6 X10*3/UL (ref 4.4–11.3)

## 2024-09-09 PROCEDURE — 80061 LIPID PANEL: CPT

## 2024-09-09 PROCEDURE — 93880 EXTRACRANIAL BILAT STUDY: CPT

## 2024-09-09 PROCEDURE — 36415 COLL VENOUS BLD VENIPUNCTURE: CPT

## 2024-09-09 PROCEDURE — 85027 COMPLETE CBC AUTOMATED: CPT

## 2024-09-09 PROCEDURE — 93880 EXTRACRANIAL BILAT STUDY: CPT | Performed by: INTERNAL MEDICINE

## 2024-09-09 PROCEDURE — 80053 COMPREHEN METABOLIC PANEL: CPT

## 2024-09-11 ENCOUNTER — TELEPHONE (OUTPATIENT)
Dept: CARDIOLOGY | Facility: CLINIC | Age: 74
End: 2024-09-11
Payer: MEDICARE

## 2024-09-11 NOTE — TELEPHONE ENCOUNTER
Left detailed message on patient's personal voicemail advising of message. Encouraged patient to return the call if there are any questions or concerns.   Roopa Thorne MA

## 2024-09-11 NOTE — TELEPHONE ENCOUNTER
----- Message from Rasheed Jurado sent at 9/10/2024  4:23 PM EDT -----  Carotid ultrasound reviewed and looks fine.  Some plaque but no significant carotid artery blockage.  Labs look fine as well.  Can discuss in detail at her upcoming office visit.  Thanks.

## 2024-09-16 ENCOUNTER — APPOINTMENT (OUTPATIENT)
Dept: CARDIOLOGY | Facility: CLINIC | Age: 74
End: 2024-09-16
Payer: MEDICARE

## 2024-09-16 VITALS
HEART RATE: 63 BPM | BODY MASS INDEX: 39.66 KG/M2 | SYSTOLIC BLOOD PRESSURE: 128 MMHG | DIASTOLIC BLOOD PRESSURE: 70 MMHG | WEIGHT: 202 LBS | HEIGHT: 60 IN

## 2024-09-16 DIAGNOSIS — E78.49 OTHER HYPERLIPIDEMIA: ICD-10-CM

## 2024-09-16 DIAGNOSIS — F17.200 CURRENT SMOKER ON SOME DAYS: ICD-10-CM

## 2024-09-16 DIAGNOSIS — I65.23 ATHEROSCLEROSIS OF BOTH CAROTID ARTERIES: ICD-10-CM

## 2024-09-16 DIAGNOSIS — I10 PRIMARY HYPERTENSION: ICD-10-CM

## 2024-09-16 PROCEDURE — 1036F TOBACCO NON-USER: CPT | Performed by: INTERNAL MEDICINE

## 2024-09-16 PROCEDURE — 3074F SYST BP LT 130 MM HG: CPT | Performed by: INTERNAL MEDICINE

## 2024-09-16 PROCEDURE — 1157F ADVNC CARE PLAN IN RCRD: CPT | Performed by: INTERNAL MEDICINE

## 2024-09-16 PROCEDURE — 3008F BODY MASS INDEX DOCD: CPT | Performed by: INTERNAL MEDICINE

## 2024-09-16 PROCEDURE — 3078F DIAST BP <80 MM HG: CPT | Performed by: INTERNAL MEDICINE

## 2024-09-16 PROCEDURE — 99214 OFFICE O/P EST MOD 30 MIN: CPT | Performed by: INTERNAL MEDICINE

## 2024-09-16 PROCEDURE — 1159F MED LIST DOCD IN RCRD: CPT | Performed by: INTERNAL MEDICINE

## 2024-09-16 RX ORDER — ATORVASTATIN CALCIUM 40 MG/1
40 TABLET, FILM COATED ORAL DAILY
Qty: 90 TABLET | Refills: 3 | Status: SHIPPED | OUTPATIENT
Start: 2024-09-16 | End: 2025-09-16

## 2024-09-16 RX ORDER — CLOPIDOGREL BISULFATE 75 MG/1
75 TABLET ORAL DAILY
Qty: 90 TABLET | Refills: 3 | Status: SHIPPED | OUTPATIENT
Start: 2024-09-16 | End: 2025-09-16

## 2024-09-16 RX ORDER — AMLODIPINE BESYLATE 5 MG/1
5 TABLET ORAL DAILY
Qty: 90 TABLET | Refills: 3 | Status: SHIPPED | OUTPATIENT
Start: 2024-09-16 | End: 2025-09-16

## 2024-09-16 ASSESSMENT — ENCOUNTER SYMPTOMS
OCCASIONAL FEELINGS OF UNSTEADINESS: 0
DEPRESSION: 0
LOSS OF SENSATION IN FEET: 0
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
## 2024-09-17 ENCOUNTER — EVALUATION (OUTPATIENT)
Dept: PHYSICAL THERAPY | Facility: CLINIC | Age: 74
End: 2024-09-17
Payer: MEDICARE

## 2024-09-17 ENCOUNTER — DOCUMENTATION (OUTPATIENT)
Dept: PHYSICAL THERAPY | Facility: CLINIC | Age: 74
End: 2024-09-17

## 2024-09-17 DIAGNOSIS — S46.011D TRAUMATIC COMPLETE TEAR OF RIGHT ROTATOR CUFF, SUBSEQUENT ENCOUNTER: ICD-10-CM

## 2024-09-17 PROCEDURE — 97161 PT EVAL LOW COMPLEX 20 MIN: CPT | Mod: GP

## 2024-09-17 ASSESSMENT — PAIN - FUNCTIONAL ASSESSMENT: PAIN_FUNCTIONAL_ASSESSMENT: 0-10

## 2024-09-17 ASSESSMENT — PAIN SCALES - GENERAL: PAINLEVEL_OUTOF10: 0 - NO PAIN

## 2024-09-17 NOTE — PROGRESS NOTES
Anh Patino  [id number]     Call date: 9/17/24  Time: 1545    Reason: I called to relay message from Vale Hinkle PA-C (under Dr. Ocampo) about Anh's precautions. Pt had already left the clinic before reply came through.     I left a voicemail stating to continue with R shoulder sling and refer to them with more info on Tuesday.       Suzette Kemp, PT, DPT

## 2024-09-17 NOTE — PROGRESS NOTES
"Physical Therapy Evaluation    Patient Name: Anh Patino  MRN: [id number]  Time Calculation  Start Time: 1445  Stop Time: 1500  Time Calculation (min): 15 min  PT Evaluation Time Entry  PT Evaluation (Low) Time Entry: 15                      Current Problem  1. Traumatic complete tear of right rotator cuff, subsequent encounter  Referral to Physical Therapy        Insurance    Insurance reviewed   Visit number: 1  Approved number of visits: BOA  ANTHEM MEDICARE BOA COPAY 25 DED 0  COVERAGE 80 OOP [phone]) AUTH REQ#[id number]  1 VISIT 9-17-24 THRU 10-1-24 REF       Subjective   General:  Pt reports to the clinic with complaint of s/p R shoulder scope with revised RTC repair on 8/13/24 with Dr. Ocampo. Sx started in March of 2023 after a fall. She had a previous RCR on same shoulder before fell and re injured during healing. Pt would like to return to housework/yardwork following completion of PT. Patient denies any abnormal/calf pain, acute/abnormal lower leg swelling, shortness of breath or any overt signs of DVT/PE. Patient also denies any acute irritation of surgical site, no odorous/abnormally colored discharge or overt signs of infection. Denies any falls since one in March. Of note pt came to clinic without R shoulder sling donned and stated she hasn't been wearing it since Monday 9/16 (5 weeks out on 9/20) d/t not having pain and has been actively using her arm. Pt denies any /GI changes, night sweats/pain, loss of appetite, or unintentional weight loss/gain.       Occupation: retired     Lifestyle: walking     Living Environment: lives with      Precautions:  [facility]- RTC protocol     Spoke with Vale MARAVILLA on 9/17 to clarify protocol since pt had progressed herself: \", let's slow her down, have her continue wearing the sling and just progress according to the RTC protocol. Encourage her not to push the shoulder and emphasize she could reinjure the shoulder if she does too much\"     Pt does have " follow up with them on 9/24    Pain:  Description of Symptoms:    Pain:   Severity: 0/10 current and best   5/10 at worst when laying flat on back in bed  Duration of Pain: intermittent, able to sleep   Aggravating Factors: laying flat on back in bed  Relieving Factors: none   Associated Symptoms: denies numbness and tingling   Functional Limitations: none     Reviewed medical screening form with pt and medical screening assessed    Imaging:     Objective   Shoulder Musculoskeletal Exam    Inspection    Right      Right shoulder inspection is normal.    Palpation    Right      Right shoulder palpation is normal.    Range of Motion    Right      Active ROM comment: NT d/t protocol.       Passive forward elevation: 180.       Passive abduction: 180.       Passive external rotation in abduction: 110.       Passive internal rotation 90 degrees: NT.     Strength    Strength additional comments: NT until protocol is clarified          Outcome Measures:  Other Measures  Disability of Arm Shoulder Hand (DASH): 25     Treatment:   Includes measures for eval     EDUCATION/HEP:  None- clarifying protocol     Assessment:  Pt reports to the clinic with complaint of s/p R shoulder scope with revised RTC repair on 8/13/24 with Dr. Ocamop. Sx started in March of 2023 after a fall. She had a previous RCR on same shoulder before fell and re injured during healing. Pt would like to return to housework/yardwork following completion of PT. Patient denies any abnormal/calf pain, acute/abnormal lower leg swelling, shortness of breath or any overt signs of DVT/PE. Patient also denies any acute irritation of surgical site, no odorous/abnormally colored discharge or overt signs of infection. Denies any falls since one in March. Of note pt came to clinic without R shoulder sling donned and stated she hasn't been wearing it since Monday 9/16 (5 weeks out on 9/20) d/t not having pain and has been actively using her arm. Pt denies any /GI  changes, night sweats/pain, loss of appetite, or unintentional weight loss/gain. Pt had full passive ROM and states she has already been using arm actively. I will be reaching out to [facility] team to clarify next steps.     Clinical Presentation: Stable     Level of Complexity: Low    Goals:  Pt will report at least 75% improvement in R shoulder pain during everyday activities.  Pt will demo >/= 4+/5 strength of R shoulder musculature without pain.  Pt will demo full and symmetrical AROM of cervical spine without pain.  Pt will demo symmetrical A/PROM of R shoulder to  without pain.  Pt will improve Quick DASH score by at least 20% (MCID) to indicate a significant improvement in overall function.   Pt will demo independence and report compliance with HEP.     Plan  1x/week for 6 visits     Skilled therapeutic intervention to address the above mentioned physical and functional impairments and limitations including, but not limited to: patient education, therapeutic exercise, therapeutic activity, manual therapy, body mechanics training, dry needling, blood flow restriction training, instrumented soft tissue mobilization, manual soft tissue mobilization, gait retraining, biofeedback, cryotherapy, electrical stimulation, home program development, hot pack, taping, neuromuscular re-education, self-care/home management, and vasopneumatic compression.

## 2024-09-23 ENCOUNTER — TELEPHONE (OUTPATIENT)
Dept: PHYSICAL THERAPY | Facility: CLINIC | Age: 74
End: 2024-09-23
Payer: MEDICARE

## 2024-09-23 NOTE — PROGRESS NOTES
History of Present Illness:  Date of Surgery: 8/16/2024.  Right shoulder scope with revision rotator cuff repair.  Overall patient states that shoulder is improving, no complaints today.  She went to therapy once. She states that she still has a bump on one one her portals.     Exam:  Mild effusion  Right shoulder incisions clean dry intact healing well without drainage  Right shoulder range of motion intentionally not tested today  No calf swelling   Negative Roxie's test  Distal neurovascular exam intact    Assessment:  S/p right shoulder scope with rotator cuff repair    Plan:  Can discontinue sling.  Continue working with therapy once a week for six weeks.   Follow-up in 6 weeks      Scribe Attestation  By signing my name below, Fidelia MORSE Scribe   attest that this documentation has been prepared under the direction and in the presence of Khari Ocampo MD.

## 2024-09-24 ENCOUNTER — TELEPHONE (OUTPATIENT)
Dept: PHYSICAL THERAPY | Facility: CLINIC | Age: 74
End: 2024-09-24
Payer: MEDICARE

## 2024-09-24 ENCOUNTER — OFFICE VISIT (OUTPATIENT)
Dept: ORTHOPEDIC SURGERY | Facility: CLINIC | Age: 74
End: 2024-09-24
Payer: MEDICARE

## 2024-09-24 DIAGNOSIS — S46.011D TRAUMATIC COMPLETE TEAR OF RIGHT ROTATOR CUFF, SUBSEQUENT ENCOUNTER: ICD-10-CM

## 2024-09-24 PROCEDURE — 99024 POSTOP FOLLOW-UP VISIT: CPT | Performed by: ORTHOPAEDIC SURGERY

## 2024-09-24 PROCEDURE — 99211 OFF/OP EST MAY X REQ PHY/QHP: CPT | Performed by: ORTHOPAEDIC SURGERY

## 2024-09-24 PROCEDURE — 1157F ADVNC CARE PLAN IN RCRD: CPT | Performed by: ORTHOPAEDIC SURGERY

## 2024-09-24 NOTE — TELEPHONE ENCOUNTER
Called and LVM for patient to schedule PT follow ups.    AUTH: CAMERON  APPROVED  6 ( 11 APPROVED TOTAL)    PT VISITS  9-18-24 THRU  12-16-24  AUTH# [id number]                  [id number]    POC: 1x 6 visits Suzette and Manny

## 2024-09-25 NOTE — PROGRESS NOTES
"Physical Therapy Treatment    Patient Name: Anh Patino  MRN: [id number]  Today's Date: 9/26/2024  Time Calculation  Start Time: 1242  Stop Time: 1323  Time Calculation (min): 41 min     PT Therapeutic Procedures Time Entry  Manual Therapy Time Entry: 15  Therapeutic Exercise Time Entry: 26                 ANTHEM  APPROVED  6 ( 11 APPROVED TOTAL)    PT VISITS  9-18-24 THRU  12-16-24  AUTH# [id number]                  [id number]  Current Problem  1. Traumatic complete tear of right rotator cuff, subsequent encounter  Referral to Physical Therapy          Subjective   General   s/p R shoulder scope with revised RTC repair on 8/13/24 with Dr. Ocampo  Patient reports that she saw her presiding physician's office this week and was told she could discontinue the sling. Overall she is doing well. Mild soreness. Still avoiding active use of the right arm.  Patient reports that she has a \"lump\" under one of her incision sites. When she saw her presiding surgeon's physician assistant this week, she was told to monitor and to call the office if it does not reduce  Precautions  Plan:  Can discontinue sling.  Continue working with therapy once a week for six weeks.   Follow-up in 6 weeks  Pain  2/10    Objective   PROM Shoulder AROM R  Flexion: 150 degrees  Abduction 150 degrees  ER at 30 degrees abduction: 50 degrees  Treatments:  -Manual 10 minutes  STM through bilateral posterior and lateral cervical musculature, superior shoulder musculature  R first rib joint mobilizations    PROM of R shoulder in all planes, to tolerance 15 minutes  Passive Forward table slides 2x10  Passive Scaption table slides 2x10  Passive abduction table slide 2x10          OP EDUCATION/HEP:  Access Code: [email or web address]  URL: [email or web address]  Date: 09/26/2024  Prepared by: Jerad Wall    Exercises  - Seated Shoulder Flexion Towel Slide at Table Top  - 1 x daily - 7 x weekly - 10 reps  - Seated Shoulder Abduction Towel Slide at " Table Top  - 1 x daily - 7 x weekly - 10 reps  - Seated Shoulder Scaption Slide at Table Top with Forearm in Neutral  - 1 x daily - 7 x weekly - 10 reps        Assessment   Patient tolerated treatment well. Patient is displaying good quality and quantity of right GH PROM flexion, abduction and ER. She reports minimal discomfort with this. Reiterated education to patient regarding minimal active use of right shoulder and to avoid lifting with right arm for the time being. Patient tolerated introduction of PROM table slide flexion/scaption/abduction well. Patient advised regarding potential post-session soreness and mitigation strategies. Patient advised to follow presiding surgeon's physician assistant's advice and to contact his office given that the lump under her incision has not reduced. Patient appeared to understand this education. P Patient appeared to understand all education provided. Will continue to benefit from skilled intervention to address the above mentioned impairments and limitations. No adverse reactions were observed or reported from patient at the conclusion of today's session.      Plan   Progress PROM and introduction AAROM as able at next visit

## 2024-09-26 ENCOUNTER — TREATMENT (OUTPATIENT)
Dept: PHYSICAL THERAPY | Facility: CLINIC | Age: 74
End: 2024-09-26
Payer: MEDICARE

## 2024-09-26 DIAGNOSIS — S46.011D TRAUMATIC COMPLETE TEAR OF RIGHT ROTATOR CUFF, SUBSEQUENT ENCOUNTER: ICD-10-CM

## 2024-09-26 PROCEDURE — 97110 THERAPEUTIC EXERCISES: CPT | Mod: GP | Performed by: PHYSICAL THERAPIST

## 2024-09-26 PROCEDURE — 97140 MANUAL THERAPY 1/> REGIONS: CPT | Mod: GP | Performed by: PHYSICAL THERAPIST

## 2024-10-18 DIAGNOSIS — Z96.653 STATUS POST BILATERAL KNEE REPLACEMENTS: ICD-10-CM

## 2024-10-18 RX ORDER — NABUMETONE 750 MG/1
750 TABLET, FILM COATED ORAL 2 TIMES DAILY
Qty: 90 TABLET | Refills: 0 | Status: SHIPPED | OUTPATIENT
Start: 2024-10-18

## 2024-10-22 ENCOUNTER — HOSPITAL ENCOUNTER (OUTPATIENT)
Dept: RADIOLOGY | Facility: CLINIC | Age: 74
Discharge: HOME | End: 2024-10-22
Payer: MEDICARE

## 2024-10-22 ENCOUNTER — OFFICE VISIT (OUTPATIENT)
Dept: ORTHOPEDIC SURGERY | Facility: CLINIC | Age: 74
End: 2024-10-22
Payer: MEDICARE

## 2024-10-22 DIAGNOSIS — Z96.653 STATUS POST BILATERAL KNEE REPLACEMENTS: ICD-10-CM

## 2024-10-22 DIAGNOSIS — Z96.653 STATUS POST BILATERAL KNEE REPLACEMENTS: Primary | ICD-10-CM

## 2024-10-22 PROCEDURE — 99213 OFFICE O/P EST LOW 20 MIN: CPT | Performed by: ORTHOPAEDIC SURGERY

## 2024-10-22 PROCEDURE — 1160F RVW MEDS BY RX/DR IN RCRD: CPT | Performed by: ORTHOPAEDIC SURGERY

## 2024-10-22 PROCEDURE — 73562 X-RAY EXAM OF KNEE 3: CPT | Mod: BILATERAL PROCEDURE | Performed by: ORTHOPAEDIC SURGERY

## 2024-10-22 PROCEDURE — 1157F ADVNC CARE PLAN IN RCRD: CPT | Performed by: ORTHOPAEDIC SURGERY

## 2024-10-22 PROCEDURE — 1036F TOBACCO NON-USER: CPT | Performed by: ORTHOPAEDIC SURGERY

## 2024-10-22 PROCEDURE — 73562 X-RAY EXAM OF KNEE 3: CPT | Mod: 50

## 2024-10-22 PROCEDURE — 1159F MED LIST DOCD IN RCRD: CPT | Performed by: ORTHOPAEDIC SURGERY

## 2024-10-22 RX ORDER — NABUMETONE 750 MG/1
750 TABLET, FILM COATED ORAL 2 TIMES DAILY
Qty: 180 TABLET | Refills: 3 | Status: SHIPPED | OUTPATIENT
Start: 2024-10-22

## 2024-10-22 NOTE — PROGRESS NOTES
Subjective    Patient ID: Anh    Chief Complaint:   Chief Complaint   Patient presents with    Left Knee - Follow-up, Pain     Lt TKR  DOS: 12/09/07  Xrays today     Right Knee - Follow-up, Pain     Rt TK-Rev   DOS: 2/8/10     History of present illness    74-year-old female presented clinic today for follow-up bilateral knee pain status post bilateral total knee replacements.  She states overall she has been doing very well.  She gets an occasional intermittent stabbing type pain over the distal aspect of the anterior femur/quad region.  She states this only happens when she is at rest for example reading her book in a chair.  She states that this only lasts for a good 3 seconds and then its gone.  She is able to weight-bear she is able to do all her activities without any issues.  Overall fairly happy.  She continues with her Relafen which seems to help quite a bit.  Ambulating without assistance.  Reports no instability.  She states that she does massage on the right quad region which does seem to help.      Past medical , Surgical, Family and social history reviewed.      Physical exam  General: No acute distress and breathing comfortably.  Patient is pleasant and cooperative with the examination.    Extremity  Right knee is neurovascular intact.  Range of motion 0 to 115 degrees.  Mild tenderness and tightness to palpation over the anterior distal quad region right lower extremity.  No calf swelling or tenderness.  Compartments are soft.  No sign of infection.  No instability.  Good strength right lower extremity.  No tightness of the IT band.    Diagnostics  Please see dictated x-ray report      Procedure  [ none]    Assessment  Status post bilateral total knee replacements    Treatment plan  1.  At this time she states that she was concerned with her stem on the right femur however this is mostly quads.  2.  She will continue with weightbearing activity as tolerated.  Refill for her Relafen 750 mg was  sent to the pharmacy.  3.  She will continue to follow-up if she has any problems.  For complete plan and/or surgical details, please refer to Dr. Hayes's portion of the split/shared dictation.  4.  All of the patient's questions were answered.    Orders Placed This Encounter    XR knee 3 views bilateral       This note was prepared using voice recognition software.  The details of this note are correct and have been reviewed, and corrected to the best of my ability.  Some grammatical areas may persist related to the Dragon software    Holland Escobar PA-C, [facility]  [facility] [place]    [phone]    In a face-to-face encounter performed today, I Marty Hayes MD performed a history and physical examination, discussed pertinent diagnostic studies if indicated, and discussed diagnosis and management strategies with both the patient and the midlevel provider.  I reviewed the midlevel's note and agree with the documented findings and plan of care.  Greater than 50% of the evaluation and treatment decision was performed by the physician myself during today's visit.    74-year-old female here for follow-up of her knee pain she states she still gets some occasional stabs of sharp pain that only last for 2 to 3 seconds and then go away she has no difficulty with weightbearing no instability the Relafen seems to be helping very much.  She is not having side effects of the medication.  On examination she has good range of motion both knees without instability brisk cap refill compartments soft calf is nontender.  X-rays are obtained today see dictated x-ray report.  Impression is chronic tendinitis status post bilateral knee replacement.  Plan is to continue with her Relafen continue work on range of motion strengthening if she has increased pain or discomfort she will let me know otherwise follow-up as needed.      Patient has severe impairment related to her presenting condition.  It is  significantly impairing bodily function.  We did discuss surgical and nonsurgical options.    This note was prepared using voice recognition software.  The details of this note are correct and have been reviewed, and corrected to the best of my ability.  Some grammatical areas may persist related to the Dragon software    Marty Hayes MD  Senior Attending Physician  [facility]    [phone]

## 2024-11-04 NOTE — PROGRESS NOTES
History of Present Illness:  Date of Surgery: 8/16/2024.  Right shoulder scope with revision rotator cuff repair. She is doing well and her one portal has healed. She has been working with formal therapy.     Exam:  Mild effusion  Portals healed  External rotation to 40  No calf swelling   Negative Roxie's test  Distal neurovascular exam intact    Assessment:  S/p right shoulder scope with rotator cuff repair    Plan:  Continue working on her own therapy.   Follow-up in 2 months with 2 view x-ray of shoulder.      Matthew Attestation  By signing my name below, IFidelia Scribe   attest that this documentation has been prepared under the direction and in the presence of Khari Ocampo MD.

## 2024-11-05 ENCOUNTER — OFFICE VISIT (OUTPATIENT)
Dept: ORTHOPEDIC SURGERY | Facility: CLINIC | Age: 74
End: 2024-11-05
Payer: MEDICARE

## 2024-11-05 DIAGNOSIS — S46.011D TRAUMATIC COMPLETE TEAR OF RIGHT ROTATOR CUFF, SUBSEQUENT ENCOUNTER: Primary | ICD-10-CM

## 2024-11-05 PROCEDURE — 99024 POSTOP FOLLOW-UP VISIT: CPT | Performed by: ORTHOPAEDIC SURGERY

## 2024-11-05 PROCEDURE — 1157F ADVNC CARE PLAN IN RCRD: CPT | Performed by: ORTHOPAEDIC SURGERY

## 2024-11-05 PROCEDURE — 99211 OFF/OP EST MAY X REQ PHY/QHP: CPT | Performed by: ORTHOPAEDIC SURGERY

## 2025-01-06 NOTE — PROGRESS NOTES
History of Present Illness:  Date of Surgery: 8/16/2024.  Right shoulder scope with revision rotator cuff repair. Overall her shoulder is doing well with no complaints.     Imaging:  X-rays right shoulder: Shows well aligned shoulder with minimal arthritis    Exam:  Mild effusion  Portals healed  External rotation to 40  Forward flexion to 130   Full passive motion  No calf swelling   Negative Roxie's test  Distal neurovascular exam intact    Assessment:  S/p right shoulder scope with rotator cuff repair, doing well    Plan:  Continue working on her own therapy.   Follow-up in 2 months no x-rays needed    Past Medical History:   Diagnosis Date    Acute candidiasis of vulva and vagina 09/04/2015    Vaginal candidiasis    Cervicalgia 04/13/2016    Neck pain    Disorientation, unspecified 08/10/2015    Episodic confusion    Encounter for other screening for malignant neoplasm of breast 09/04/2015    Screening for breast cancer    Impacted cerumen, left ear 01/11/2016    Impacted cerumen of left ear    Insect bite (nonvenomous) of lower back and pelvis, initial encounter 09/04/2015    Tick bite of back    Lyme disease, unspecified 09/04/2015    Erythema chronicum migrans    Other acute postprocedural pain 03/30/2022    Post-op pain    Other symptoms and signs involving the musculoskeletal system 06/29/2015    Weakness of hand    Personal history of diseases of the skin and subcutaneous tissue 01/11/2016    History of seborrhea    Personal history of other diseases of the circulatory system 03/05/2015    History of hypertension    Personal history of other diseases of the circulatory system     History of carotid artery stenosis    Personal history of other diseases of the musculoskeletal system and connective tissue 09/25/2015    History of backache    Personal history of other diseases of the respiratory system 07/01/2016    History of acute bronchitis    Personal history of other mental and behavioral disorders  04/06/2016    History of depression    Personal history of other specified conditions 09/25/2015    History of urinary frequency    Personal history of other specified conditions 08/30/2015    History of headache    Unspecified condition associated with female genital organs and menstrual cycle 01/11/2016    Vaginal burning    Urinary tract infection, site not specified 09/25/2015    Acute UTI    Urinary tract infection, site not specified 09/25/2015    Acute lower UTI    Urinary tract infection, site not specified 01/11/2016    UTI (lower urinary tract infection)       Medication Documentation Review Audit       Reviewed by Marty Hayes MD (Physician) on 10/23/24 at 0559      Medication Order Taking? Sig Documenting Provider Last Dose Status   albuterol 90 mcg/actuation inhaler [id number] No Inhale 2 puffs every 6 hours if needed for wheezing. Greta Hinojosa MD Taking Active   amLODIPine (Norvasc) 5 mg tablet [id number]  Take 1 tablet (5 mg) by mouth once daily. Rasheed Jurado MD  Active   atorvastatin (Lipitor) 40 mg tablet [id number]  Take 1 tablet (40 mg) by mouth once daily. Rasheed Jurado MD  Active   calcium carbonate 600 mg calcium (1,500 mg) tablet [id number] No Take 1 tablet (1,500 mg) by mouth once daily. Historical Provider, MD Taking Active   cholecalciferol (Vitamin D-3) 50 MCG (2000 UT) tablet [id number] No Take 1 tablet (2,000 Units) by mouth once daily. Greta Hinojosa MD Taking Active   clopidogrel (Plavix) 75 mg tablet [id number]  Take 1 tablet (75 mg) by mouth once daily. Rasheed Jurado MD  Active   glucosamine/chondr lopez A sod (glucosamine-chondroitin) 1,500-1,200 mg/30 mL liquid [id number] No Take by mouth. Use PO as directed Greta Hinojosa MD Taking Active   levothyroxine (Synthroid, Levoxyl) 88 mcg tablet [id number] No Take 1 tablet (88 mcg) by mouth once daily. Greta Hinojosa MD Taking Active   Discontinued 10/18/24 1140     Discontinued 10/22/24 7919    nabumetone (Relafen) 750 mg tablet [id number]  Take 1 tablet (750 mg) by mouth 2 times a day. Marty Hayes MD  Active   omeprazole (PriLOSEC) 40 mg DR capsule [id number] No Take 1 capsule (40 mg) by mouth once daily. Historical Provider, MD Taking Active   traZODone (Desyrel) 150 mg tablet [id number] No Take 2 tablets (300 mg) by mouth once daily at bedtime. Historical Provider, MD Taking Active   vilazodone (Viibryd) 40 mg tablet [id number] No Take 1 tablet (40 mg) by mouth once daily. Historical Provider, MD Taking Active   vitamin B complex tablet [id number] No Take 1 tablet by mouth once daily. Historical Provider, MD Taking Active                    Allergies   Allergen Reactions    Colesevelam Other    Diazepam Unknown    Duloxetine Seizure    Fluvastatin Unknown    Iodides Unknown    Iodinated Contrast Media Unknown    Iodine Unknown    Latex Unknown    Morphine Unknown    Other Unknown     ADHESIVE BANDAGES    Pravastatin Unknown       Social History     Socioeconomic History    Marital status:      Spouse name: Not on file    Number of children: Not on file    Years of education: Not on file    Highest education level: Not on file   Occupational History    Not on file   Tobacco Use    Smoking status: Former     Types: Cigarettes    Smokeless tobacco: Never   Substance and Sexual Activity    Alcohol use: Never    Drug use: Not on file    Sexual activity: Not on file   Other Topics Concern    Not on file   Social History Narrative    Not on file     Social Drivers of Health     Financial Resource Strain: Not on file   Food Insecurity: Not on file   Transportation Needs: Not on file   Physical Activity: Not on file   Stress: Not on file   Social Connections: Not on file   Intimate Partner Violence: Not on file   Housing Stability: Not on file       Past Surgical History:   Procedure Laterality Date    APPENDECTOMY  04/07/2015    Appendectomy    BLADDER SURGERY  04/07/2015    Bladder Surgery    BREAST  BIOPSY  12/02/2016    Biopsy Breast Percutaneous Needle Core    GALLBLADDER SURGERY  04/07/2015    Gallbladder Surgery    HERNIA REPAIR  04/07/2015    Hernia Repair    HYSTERECTOMY  04/07/2015    Hysterectomy    INCISIONAL BREAST BIOPSY  12/02/2016    Incisional Breast Biopsy    OTHER SURGICAL HISTORY  03/05/2015    Install Vagal Nerve Neurostimulator By Incision With Pulse Generator    OTHER SURGICAL HISTORY  12/02/2016    Salpingo-oophorectomy    OTHER SURGICAL HISTORY  04/07/2015    Breast Surgery Reduction Procedure Elective    OTHER SURGICAL HISTORY  07/13/2022    Cataract surgery    OTHER SURGICAL HISTORY  07/13/2022    Cardiac catheterization    OTHER SURGICAL HISTORY  07/13/2022    Arm surgery    OTHER SURGICAL HISTORY  07/13/2022    Knee surgery    OTHER SURGICAL HISTORY  07/13/2022    Colonoscopy    OTHER SURGICAL HISTORY  07/13/2022    Rotator cuff repair    OTHER SURGICAL HISTORY  07/13/2022    Surgery    TOTAL KNEE ARTHROPLASTY  04/07/2015    Knee Replacement     Scribe Attestation  By signing my name below, I, Fidelia Eason Scribluis daniel   attest that this documentation has been prepared under the direction and in the presence of Khari Ocampo MD.

## 2025-01-07 ENCOUNTER — OFFICE VISIT (OUTPATIENT)
Dept: ORTHOPEDIC SURGERY | Facility: CLINIC | Age: 75
End: 2025-01-07
Payer: MEDICARE

## 2025-01-07 ENCOUNTER — HOSPITAL ENCOUNTER (OUTPATIENT)
Dept: RADIOLOGY | Facility: CLINIC | Age: 75
Discharge: HOME | End: 2025-01-07
Payer: MEDICARE

## 2025-01-07 DIAGNOSIS — S46.011D TRAUMATIC COMPLETE TEAR OF RIGHT ROTATOR CUFF, SUBSEQUENT ENCOUNTER: ICD-10-CM

## 2025-01-07 PROCEDURE — 1157F ADVNC CARE PLAN IN RCRD: CPT | Performed by: ORTHOPAEDIC SURGERY

## 2025-01-07 PROCEDURE — 73030 X-RAY EXAM OF SHOULDER: CPT | Mod: RT

## 2025-01-07 PROCEDURE — 99213 OFFICE O/P EST LOW 20 MIN: CPT | Performed by: ORTHOPAEDIC SURGERY

## 2025-01-07 PROCEDURE — 73030 X-RAY EXAM OF SHOULDER: CPT | Mod: RIGHT SIDE | Performed by: ORTHOPAEDIC SURGERY

## 2025-02-03 ENCOUNTER — HOSPITAL ENCOUNTER (OUTPATIENT)
Dept: RADIOLOGY | Facility: CLINIC | Age: 75
Discharge: HOME | End: 2025-02-03
Payer: MEDICARE

## 2025-02-03 ENCOUNTER — OFFICE VISIT (OUTPATIENT)
Dept: ORTHOPEDIC SURGERY | Facility: CLINIC | Age: 75
End: 2025-02-03
Payer: MEDICARE

## 2025-02-03 DIAGNOSIS — S46.011D TRAUMATIC COMPLETE TEAR OF RIGHT ROTATOR CUFF, SUBSEQUENT ENCOUNTER: ICD-10-CM

## 2025-02-03 DIAGNOSIS — Z96.653 STATUS POST BILATERAL KNEE REPLACEMENTS: ICD-10-CM

## 2025-02-03 DIAGNOSIS — S83.91XA SPRAIN OF RIGHT KNEE, UNSPECIFIED LIGAMENT, INITIAL ENCOUNTER: ICD-10-CM

## 2025-02-03 DIAGNOSIS — S43.401A SPRAIN OF RIGHT SHOULDER, INITIAL ENCOUNTER: ICD-10-CM

## 2025-02-03 PROCEDURE — 73030 X-RAY EXAM OF SHOULDER: CPT | Mod: RT

## 2025-02-03 PROCEDURE — 1159F MED LIST DOCD IN RCRD: CPT | Performed by: STUDENT IN AN ORGANIZED HEALTH CARE EDUCATION/TRAINING PROGRAM

## 2025-02-03 PROCEDURE — L3670 SO ACRO/CLAV CAN WEB PRE OTS: HCPCS | Performed by: STUDENT IN AN ORGANIZED HEALTH CARE EDUCATION/TRAINING PROGRAM

## 2025-02-03 PROCEDURE — 73564 X-RAY EXAM KNEE 4 OR MORE: CPT | Mod: RT

## 2025-02-03 PROCEDURE — 99214 OFFICE O/P EST MOD 30 MIN: CPT | Performed by: STUDENT IN AN ORGANIZED HEALTH CARE EDUCATION/TRAINING PROGRAM

## 2025-02-03 PROCEDURE — 1036F TOBACCO NON-USER: CPT | Performed by: STUDENT IN AN ORGANIZED HEALTH CARE EDUCATION/TRAINING PROGRAM

## 2025-02-03 PROCEDURE — L1812 KO ELASTIC W/JOINTS PRE OTS: HCPCS | Performed by: STUDENT IN AN ORGANIZED HEALTH CARE EDUCATION/TRAINING PROGRAM

## 2025-02-03 PROCEDURE — 73030 X-RAY EXAM OF SHOULDER: CPT | Mod: RIGHT SIDE | Performed by: STUDENT IN AN ORGANIZED HEALTH CARE EDUCATION/TRAINING PROGRAM

## 2025-02-03 PROCEDURE — 1157F ADVNC CARE PLAN IN RCRD: CPT | Performed by: STUDENT IN AN ORGANIZED HEALTH CARE EDUCATION/TRAINING PROGRAM

## 2025-02-03 PROCEDURE — 73564 X-RAY EXAM KNEE 4 OR MORE: CPT | Mod: RIGHT SIDE | Performed by: STUDENT IN AN ORGANIZED HEALTH CARE EDUCATION/TRAINING PROGRAM

## 2025-02-03 RX ORDER — METHYLPREDNISOLONE 4 MG/1
TABLET ORAL
Qty: 1 EACH | Refills: 0 | Status: SHIPPED | OUTPATIENT
Start: 2025-02-03

## 2025-02-03 NOTE — PROGRESS NOTES
Acute Injury Established Patient Visit    HPI: Anh is a 74 y.o.female who presents today with new complaints of right shoulder and right knee injury.  She fell down the stairs last week.  On 1/29/2025, she was walking down the stairs, slipped on ice, went down about 4 steps.  She landed on her right side.  She states that she is having worse pain in the right shoulder.  Of note, she is a patient of Dr. Khari Ocampo she did surgery for her rotator cuff 6 months ago.  She denies any swelling or bruising.  She denies any numbness tingling.  She states that it is very sore, however.  Of note, she is also a patient of Dr. Marty Valle, who did knee replacements 20 years ago on her.  She has some knee pain anteriorly, but has no significant swelling or bruising.  She has been walking okay.    Plan: For this right shoulder sprain and right knee sprain, we will start her on a Medrol Dosepak, hinged knee brace, sling, coming out for gentle range of motion.  Additionally, discussed conservative treatment measures including rest, ice, elevation, compression, and over-the-counter analgesia as needed and as appropriate.  Risks of NSAID use, steroid use, and muscle relaxers discussed in depth and considered in light of medical comorbidities.  Patient, and parent/guardian as applicable, understand agree with plan.  Follow-up: With Dr. Khari Ocampo in 1 week  X-rays on follow-up: None      Assessment:   Problem List Items Addressed This Visit    None  Visit Diagnoses       Traumatic complete tear of right rotator cuff, subsequent encounter        Relevant Orders    XR shoulder right 2+ views    XR knee right 4+ views    Status post bilateral knee replacements        Relevant Orders    XR shoulder right 2+ views    XR knee right 4+ views    Sprain of right knee, unspecified ligament, initial encounter        Relevant Medications    methylPREDNISolone (Medrol Dospak) 4 mg tablets    Other Relevant Orders    Knee Brace, Hinged     Sprain of right shoulder, initial encounter        Relevant Medications    methylPREDNISolone (Medrol Dospak) 4 mg tablets    Other Relevant Orders    Sling            Diagnostics: Reviewed all relevant imaging including x-ray, MRI, CT, and US.      Procedure:  Procedures    Physical Exam:  GENERAL:  No obvious acute distress.  NEURO:  Distally neurovascularly intact.  Sensation intact to light touch.  Extremity: Right shoulder exam shows:  Skin is intact;  No erythema or warmth;  No edema or ecchymosis;  No clinical signs of infection;  Can forward flex to 150 degrees;  Abduction to 75 degrees;  External rotation from neutral at 75 degrees;  Internal rotation at the level of T10;  No signs of impingement with Castro or Neer's test;  Negative empty can test;  Negative crossarm test;  No pain over the AC joint;  Negative [place]'s test;  Negative sulcus sign;  Negative anterior apprehension's test; and  Neurovascularly intact.    Right knee examination shows:  Skin is intact, but with a minor abrasion on the anterior knee;  No erythema or warmth;  No edema or ecchymosis;  No effusion;  Can flex the left knee to 130 degrees;  Full extension at 0 degrees;  No pain over the patella;  Negative patellar grind test;  Mildly TENDER over the medial joint line;  No pain over the lateral joint line;  No pain over the patellar or quadricep tendon;  No pain over the proximal tibia;  No pain over the popliteal fossa;  Weakly POSITIVE valgus stress test;  Negative varus stress test;  Negative Navdeep's test medially with no instability;  Negative Navdeep's test laterally with no instability;  Negative Lachman's test;  Patellar and quadricep mechanism intact;  Negative anterior and posterior drawer test;  Negative patellar apprehension test;  Distal pulses are palpable;  Neurovascularly intact; and  Walking with no significant antalgic gait.    Orders Placed This Encounter    Knee Brace, Hinged    Sling    XR shoulder right 2+  views    XR knee right 4+ views    methylPREDNISolone (Medrol Dospak) 4 mg tablets      At the conclusion of the visit there were no further questions by the patient/family regarding their plan of care.  Patient was instructed to call or return with any issues, questions, or concerns regarding their injury and/or treatment plan described above.     02/03/25 at 5:22 PM - Gerard Storm DO    Office: [phone]    This note was prepared using voice recognition software.  The details of this note are correct and have been reviewed, and corrected to the best of my ability.  Some grammatical errors may persist related to the Dragon software.

## 2025-02-12 NOTE — PROGRESS NOTES
History of Present Illness:  Date of Surgery: 8/16/2024.  Right shoulder scope with revision rotator cuff repair. She was seen by Dr. Storm on 2/3/25 after she had a slip and fall on 1/29/25. Her shoulder continues to be sore and painful.     Imaging:  X-rays right shoulder: Shows well aligned shoulder with minimal arthritis    Exam:  Mild effusion  Portals healed  External rotation to 40  Forward flexion to 130   Full passive motion  4.5/5 rotator cuff strength    Assessment:  Right shoulder strain s/p right shoulder scope with rotator cuff repair, doing well    Plan:  Reassurance   Continue Tylenol for pain relief and home exercise  Follow-up in 3-4 weeks with repeat clinical exam    Past Medical History:   Diagnosis Date    Acute candidiasis of vulva and vagina 09/04/2015    Vaginal candidiasis    Cervicalgia 04/13/2016    Neck pain    Disorientation, unspecified 08/10/2015    Episodic confusion    Encounter for other screening for malignant neoplasm of breast 09/04/2015    Screening for breast cancer    Impacted cerumen, left ear 01/11/2016    Impacted cerumen of left ear    Insect bite (nonvenomous) of lower back and pelvis, initial encounter 09/04/2015    Tick bite of back    Lyme disease, unspecified 09/04/2015    Erythema chronicum migrans    Other acute postprocedural pain 03/30/2022    Post-op pain    Other symptoms and signs involving the musculoskeletal system 06/29/2015    Weakness of hand    Personal history of diseases of the skin and subcutaneous tissue 01/11/2016    History of seborrhea    Personal history of other diseases of the circulatory system 03/05/2015    History of hypertension    Personal history of other diseases of the circulatory system     History of carotid artery stenosis    Personal history of other diseases of the musculoskeletal system and connective tissue 09/25/2015    History of backache    Personal history of other diseases of the respiratory system 07/01/2016    History of  acute bronchitis    Personal history of other mental and behavioral disorders 04/06/2016    History of depression    Personal history of other specified conditions 09/25/2015    History of urinary frequency    Personal history of other specified conditions 08/30/2015    History of headache    Unspecified condition associated with female genital organs and menstrual cycle 01/11/2016    Vaginal burning    Urinary tract infection, site not specified 09/25/2015    Acute UTI    Urinary tract infection, site not specified 09/25/2015    Acute lower UTI    Urinary tract infection, site not specified 01/11/2016    UTI (lower urinary tract infection)       Medication Documentation Review Audit       Reviewed by Becky Bustamante MA (Medical Assistant) on 02/03/25 at 1400      Medication Order Taking? Sig Documenting Provider Last Dose Status   albuterol 90 mcg/actuation inhaler [id number] No Inhale 2 puffs every 6 hours if needed for wheezing. Greta Hinojosa MD Taking Active   amLODIPine (Norvasc) 5 mg tablet [id number]  Take 1 tablet (5 mg) by mouth once daily. Rasheed Jurado MD  Active   atorvastatin (Lipitor) 40 mg tablet [id number]  Take 1 tablet (40 mg) by mouth once daily. Rasheed Juardo MD  Active   calcium carbonate 600 mg calcium (1,500 mg) tablet [id number] No Take 1 tablet (1,500 mg) by mouth once daily. Historical Provider, MD Taking Active   cholecalciferol (Vitamin D-3) 50 MCG (2000 UT) tablet [id number] No Take 1 tablet (2,000 Units) by mouth once daily. Greta Provider, MD Taking Active   clopidogrel (Plavix) 75 mg tablet [id number]  Take 1 tablet (75 mg) by mouth once daily. Rasheed Jurado MD  Active   glucosamine/chondr lopez A sod (glucosamine-chondroitin) 1,500-1,200 mg/30 mL liquid [id number] No Take by mouth. Use PO as directed Greta Provider, MD Taking Active   levothyroxine (Synthroid, Levoxyl) 88 mcg tablet [id number] No Take 1 tablet (88 mcg) by mouth once daily. Greta Hinojosa  MD Taking Active   nabumetone (Relafen) 750 mg tablet [id number]  Take 1 tablet (750 mg) by mouth 2 times a day. Marty Hayes MD  Active   omeprazole (PriLOSEC) 40 mg DR capsule [id number] No Take 1 capsule (40 mg) by mouth once daily. Historical Provider, MD Taking Active   traZODone (Desyrel) 150 mg tablet [id number] No Take 2 tablets (300 mg) by mouth once daily at bedtime. Historical Provider, MD Taking Active   vilazodone (Viibryd) 40 mg tablet [id number] No Take 1 tablet (40 mg) by mouth once daily. Historical Provider, MD Taking Active   vitamin B complex tablet [id number] No Take 1 tablet by mouth once daily. Historical Provider, MD Taking Active                    Allergies   Allergen Reactions    Colesevelam Other    Diazepam Unknown    Duloxetine Seizure    Fluvastatin Unknown    Iodides Unknown    Iodinated Contrast Media Unknown    Iodine Unknown    Latex Unknown    Morphine Unknown    Other Unknown     ADHESIVE BANDAGES    Pravastatin Unknown       Social History     Socioeconomic History    Marital status:      Spouse name: Not on file    Number of children: Not on file    Years of education: Not on file    Highest education level: Not on file   Occupational History    Not on file   Tobacco Use    Smoking status: Former     Types: Cigarettes    Smokeless tobacco: Never   Substance and Sexual Activity    Alcohol use: Never    Drug use: Not on file    Sexual activity: Not on file   Other Topics Concern    Not on file   Social History Narrative    Not on file     Social Drivers of Health     Financial Resource Strain: Not on file   Food Insecurity: Not on file   Transportation Needs: Not on file   Physical Activity: Not on file   Stress: Not on file   Social Connections: Not on file   Intimate Partner Violence: Not on file   Housing Stability: Not on file       Past Surgical History:   Procedure Laterality Date    APPENDECTOMY  04/07/2015    Appendectomy    BLADDER SURGERY  04/07/2015    Bladder  Surgery    BREAST BIOPSY  12/02/2016    Biopsy Breast Percutaneous Needle Core    GALLBLADDER SURGERY  04/07/2015    Gallbladder Surgery    HERNIA REPAIR  04/07/2015    Hernia Repair    HYSTERECTOMY  04/07/2015    Hysterectomy    INCISIONAL BREAST BIOPSY  12/02/2016    Incisional Breast Biopsy    OTHER SURGICAL HISTORY  03/05/2015    Install Vagal Nerve Neurostimulator By Incision With Pulse Generator    OTHER SURGICAL HISTORY  12/02/2016    Salpingo-oophorectomy    OTHER SURGICAL HISTORY  04/07/2015    Breast Surgery Reduction Procedure Elective    OTHER SURGICAL HISTORY  07/13/2022    Cataract surgery    OTHER SURGICAL HISTORY  07/13/2022    Cardiac catheterization    OTHER SURGICAL HISTORY  07/13/2022    Arm surgery    OTHER SURGICAL HISTORY  07/13/2022    Knee surgery    OTHER SURGICAL HISTORY  07/13/2022    Colonoscopy    OTHER SURGICAL HISTORY  07/13/2022    Rotator cuff repair    OTHER SURGICAL HISTORY  07/13/2022    Surgery    TOTAL KNEE ARTHROPLASTY  04/07/2015    Knee Replacement     Scribe Attestation  By signing my name below, IFidelia Scribluis daniel   attest that this documentation has been prepared under the direction and in the presence of Khari Ocampo MD.

## 2025-02-14 ENCOUNTER — OFFICE VISIT (OUTPATIENT)
Dept: ORTHOPEDIC SURGERY | Facility: CLINIC | Age: 75
End: 2025-02-14
Payer: MEDICARE

## 2025-02-14 DIAGNOSIS — S43.401A SPRAIN OF RIGHT SHOULDER, INITIAL ENCOUNTER: Primary | ICD-10-CM

## 2025-02-14 PROCEDURE — 99213 OFFICE O/P EST LOW 20 MIN: CPT | Performed by: ORTHOPAEDIC SURGERY

## 2025-03-10 NOTE — PROGRESS NOTES
History of Present Illness:  Date of Surgery: 8/16/2024.  Right shoulder scope with revision rotator cuff repair. Her shoulder continues to bother her since her fall on 1/29/25.    Imaging:  X-rays right shoulder: Shows well aligned shoulder with minimal arthritis    Exam:  Mild effusion  She has persistent weakness throughout with rotator cuff testing and internal rotation.   Portals healed  External rotation to 40  Forward flexion to 130   Full passive motion  4/5 rotator cuff strength    Assessment:  Right shoulder strain s/p right shoulder scope with rotator cuff repair, doing well    Plan:  Medrol Dosepak  Nabumetone   We will put in an order for a CT arthrogram of the right shoulder. She tolerated her last exam with a prep for the contrast. We discussed the associated risks. I will see her back after to discuss the results.     Past Medical History:   Diagnosis Date    Acute candidiasis of vulva and vagina 09/04/2015    Vaginal candidiasis    Cervicalgia 04/13/2016    Neck pain    Disorientation, unspecified 08/10/2015    Episodic confusion    Encounter for other screening for malignant neoplasm of breast 09/04/2015    Screening for breast cancer    Impacted cerumen, left ear 01/11/2016    Impacted cerumen of left ear    Insect bite (nonvenomous) of lower back and pelvis, initial encounter 09/04/2015    Tick bite of back    Lyme disease, unspecified 09/04/2015    Erythema chronicum migrans    Other acute postprocedural pain 03/30/2022    Post-op pain    Other symptoms and signs involving the musculoskeletal system 06/29/2015    Weakness of hand    Personal history of diseases of the skin and subcutaneous tissue 01/11/2016    History of seborrhea    Personal history of other diseases of the circulatory system 03/05/2015    History of hypertension    Personal history of other diseases of the circulatory system     History of carotid artery stenosis    Personal history of other diseases of the musculoskeletal  system and connective tissue 09/25/2015    History of backache    Personal history of other diseases of the respiratory system 07/01/2016    History of acute bronchitis    Personal history of other mental and behavioral disorders 04/06/2016    History of depression    Personal history of other specified conditions 09/25/2015    History of urinary frequency    Personal history of other specified conditions 08/30/2015    History of headache    Unspecified condition associated with female genital organs and menstrual cycle 01/11/2016    Vaginal burning    Urinary tract infection, site not specified 09/25/2015    Acute UTI    Urinary tract infection, site not specified 09/25/2015    Acute lower UTI    Urinary tract infection, site not specified 01/11/2016    UTI (lower urinary tract infection)       Medication Documentation Review Audit       Reviewed by Jackeline Avila MA (Medical Assistant) on 02/14/25 at 1059      Medication Order Taking? Sig Documenting Provider Last Dose Status   albuterol 90 mcg/actuation inhaler [id number] No Inhale 2 puffs every 6 hours if needed for wheezing. Greta Hinojosa MD Taking Active   amLODIPine (Norvasc) 5 mg tablet [id number]  Take 1 tablet (5 mg) by mouth once daily. Rasheed Jurado MD  Active   atorvastatin (Lipitor) 40 mg tablet [id number]  Take 1 tablet (40 mg) by mouth once daily. Rasheed Jurado MD  Active   calcium carbonate 600 mg calcium (1,500 mg) tablet [id number] No Take 1 tablet (1,500 mg) by mouth once daily. Greta Provider, MD Taking Active   cholecalciferol (Vitamin D-3) 50 MCG (2000 UT) tablet [id number] No Take 1 tablet (2,000 Units) by mouth once daily. Greta Hinojosa MD Taking Active   clopidogrel (Plavix) 75 mg tablet [id number]  Take 1 tablet (75 mg) by mouth once daily. Rasheed Jurado MD  Active   glucosamine/chondr lopez A sod (glucosamine-chondroitin) 1,500-1,200 mg/30 mL liquid [id number] No Take by mouth. Use PO as directed Historical Provider,  MD Taking Active   levothyroxine (Synthroid, Levoxyl) 88 mcg tablet [id number] No Take 1 tablet (88 mcg) by mouth once daily. Historical Provider, MD Taking Active   methylPREDNISolone (Medrol Dospak) 4 mg tablets [id number]  Follow schedule on package instructions Gerard Storm,   Active   nabumetone (Relafen) 750 mg tablet [id number]  Take 1 tablet (750 mg) by mouth 2 times a day. Marty Hayes MD  Active   omeprazole (PriLOSEC) 40 mg DR capsule [id number] No Take 1 capsule (40 mg) by mouth once daily. Historical Provider, MD Taking Active   traZODone (Desyrel) 150 mg tablet [id number] No Take 2 tablets (300 mg) by mouth once daily at bedtime. Historical Provider, MD Taking Active   vilazodone (Viibryd) 40 mg tablet [id number] No Take 1 tablet (40 mg) by mouth once daily. Historical Provider, MD Taking Active   vitamin B complex tablet [id number] No Take 1 tablet by mouth once daily. Historical Provider, MD Taking Active                    Allergies   Allergen Reactions    Colesevelam Other    Diazepam Unknown    Duloxetine Seizure    Fluvastatin Unknown    Iodides Unknown    Iodinated Contrast Media Unknown    Iodine Unknown    Latex Unknown    Morphine Unknown    Other Unknown     ADHESIVE BANDAGES    Pravastatin Unknown       Social History     Socioeconomic History    Marital status:      Spouse name: Not on file    Number of children: Not on file    Years of education: Not on file    Highest education level: Not on file   Occupational History    Not on file   Tobacco Use    Smoking status: Former     Types: Cigarettes    Smokeless tobacco: Never   Substance and Sexual Activity    Alcohol use: Never    Drug use: Not on file    Sexual activity: Not on file   Other Topics Concern    Not on file   Social History Narrative    Not on file     Social Drivers of Health     Financial Resource Strain: Not on file   Food Insecurity: Not on file   Transportation Needs: Not on file   Physical Activity: Not on file    Stress: Not on file   Social Connections: Not on file   Intimate Partner Violence: Not on file   Housing Stability: Not on file       Past Surgical History:   Procedure Laterality Date    APPENDECTOMY  04/07/2015    Appendectomy    BLADDER SURGERY  04/07/2015    Bladder Surgery    BREAST BIOPSY  12/02/2016    Biopsy Breast Percutaneous Needle Core    GALLBLADDER SURGERY  04/07/2015    Gallbladder Surgery    HERNIA REPAIR  04/07/2015    Hernia Repair    HYSTERECTOMY  04/07/2015    Hysterectomy    INCISIONAL BREAST BIOPSY  12/02/2016    Incisional Breast Biopsy    OTHER SURGICAL HISTORY  03/05/2015    Install Vagal Nerve Neurostimulator By Incision With Pulse Generator    OTHER SURGICAL HISTORY  12/02/2016    Salpingo-oophorectomy    OTHER SURGICAL HISTORY  04/07/2015    Breast Surgery Reduction Procedure Elective    OTHER SURGICAL HISTORY  07/13/2022    Cataract surgery    OTHER SURGICAL HISTORY  07/13/2022    Cardiac catheterization    OTHER SURGICAL HISTORY  07/13/2022    Arm surgery    OTHER SURGICAL HISTORY  07/13/2022    Knee surgery    OTHER SURGICAL HISTORY  07/13/2022    Colonoscopy    OTHER SURGICAL HISTORY  07/13/2022    Rotator cuff repair    OTHER SURGICAL HISTORY  07/13/2022    Surgery    TOTAL KNEE ARTHROPLASTY  04/07/2015    Knee Replacement     Scribe Attestation  By signing my name below, IFidelia Scrkayley   attest that this documentation has been prepared under the direction and in the presence of Khari Ocampo MD.

## 2025-03-11 ENCOUNTER — OFFICE VISIT (OUTPATIENT)
Dept: ORTHOPEDIC SURGERY | Facility: CLINIC | Age: 75
End: 2025-03-11
Payer: MEDICARE

## 2025-03-11 DIAGNOSIS — S46.011D TRAUMATIC COMPLETE TEAR OF RIGHT ROTATOR CUFF, SUBSEQUENT ENCOUNTER: Primary | ICD-10-CM

## 2025-03-11 DIAGNOSIS — T50.8X5D ALLERGIC REACTION TO CONTRAST DYE, SUBSEQUENT ENCOUNTER: ICD-10-CM

## 2025-03-11 DIAGNOSIS — M25.512 LEFT SHOULDER PAIN, UNSPECIFIED CHRONICITY: ICD-10-CM

## 2025-03-11 PROCEDURE — 99214 OFFICE O/P EST MOD 30 MIN: CPT | Performed by: ORTHOPAEDIC SURGERY

## 2025-03-11 PROCEDURE — 1157F ADVNC CARE PLAN IN RCRD: CPT | Performed by: ORTHOPAEDIC SURGERY

## 2025-03-11 PROCEDURE — 1159F MED LIST DOCD IN RCRD: CPT | Performed by: ORTHOPAEDIC SURGERY

## 2025-03-11 RX ORDER — METHYLPREDNISOLONE 4 MG/1
TABLET ORAL
Qty: 21 TABLET | Refills: 0 | Status: SHIPPED | OUTPATIENT
Start: 2025-03-11

## 2025-03-11 RX ORDER — PREDNISONE 50 MG/1
TABLET ORAL
Qty: 3 TABLET | Refills: 0 | Status: SHIPPED | OUTPATIENT
Start: 2025-03-11

## 2025-04-01 DIAGNOSIS — S46.011D TRAUMATIC COMPLETE TEAR OF RIGHT ROTATOR CUFF, SUBSEQUENT ENCOUNTER: ICD-10-CM

## 2025-04-16 ENCOUNTER — HOSPITAL ENCOUNTER (OUTPATIENT)
Dept: RADIOLOGY | Facility: HOSPITAL | Age: 75
Discharge: HOME | End: 2025-04-16
Payer: MEDICARE

## 2025-04-16 DIAGNOSIS — S46.011D TRAUMATIC COMPLETE TEAR OF RIGHT ROTATOR CUFF, SUBSEQUENT ENCOUNTER: ICD-10-CM

## 2025-04-16 PROCEDURE — 2550000001 HC RX 255 CONTRASTS: Performed by: ORTHOPAEDIC SURGERY

## 2025-04-16 PROCEDURE — 2500000004 HC RX 250 GENERAL PHARMACY W/ HCPCS (ALT 636 FOR OP/ED): Performed by: RADIOLOGY

## 2025-04-16 PROCEDURE — 73201 CT UPPER EXTREMITY W/DYE: CPT | Mod: RT

## 2025-04-16 PROCEDURE — 73040 CONTRAST X-RAY OF SHOULDER: CPT | Mod: RT

## 2025-04-16 RX ORDER — LIDOCAINE HYDROCHLORIDE 20 MG/ML
15 INJECTION, SOLUTION INFILTRATION; PERINEURAL ONCE
Status: COMPLETED | OUTPATIENT
Start: 2025-04-16 | End: 2025-04-16

## 2025-04-16 RX ADMIN — LIDOCAINE HYDROCHLORIDE 3 ML: 20 INJECTION, SOLUTION INFILTRATION; PERINEURAL at 13:13

## 2025-04-16 RX ADMIN — IOHEXOL 15 ML: 240 INJECTION, SOLUTION INTRATHECAL; INTRAVASCULAR; INTRAVENOUS; ORAL at 13:12

## 2025-04-17 NOTE — PROGRESS NOTES
History of Present Illness:  Date of Surgery: 8/16/2024.  Right shoulder scope with revision rotator cuff repair. She returns today to review the results of her recent CT arthrogram.  Her shoulder continues to bother her after her fall back in January.     Imaging:  X-rays right shoulder: Shows well aligned shoulder with minimal arthritis  CT arthrogram right shoulder: Shows small evidence of re-tear in the supraspinatus at the leading edge as well as operative changes.     Exam:  Mild effusion  She has persistent weakness throughout with rotator cuff testing and internal rotation.   Portals healed  External rotation to 40  Forward flexion to 130   Full passive motion  4/5 rotator cuff strength    Assessment:  Non-operative management right full thickness rotator cuff tear    Plan:  Continue Nabumetone and ice as needed.    We discussed that this could be potential scarring or reinjury We also discussed the full thickness rotator cuff tear. We gave her a cortisone injection in the right shoulder today. We will see her back in a month. If she continues to have weakness we will proceed with revision right rotator cuff tear.     L Inj/Asp: R glenohumeral on 4/22/2025 1:58 PM  Indications: pain  Details: 22 G needle, posterior approach  Medications: 8 mL lidocaine 10 mg/mL (1 %); 40 mg triamcinolone acetonide 40 mg/mL  Outcome: tolerated well, no immediate complications  Procedure, treatment alternatives, risks and benefits explained, specific risks discussed. Consent was given by the patient. Immediately prior to procedure a time out was called to verify the correct patient, procedure, equipment, support staff and site/side marked as required. Patient was prepped and draped in the usual sterile fashion.            Past Medical History:   Diagnosis Date    Acute candidiasis of vulva and vagina 09/04/2015    Vaginal candidiasis    Cervicalgia 04/13/2016    Neck pain    Disorientation, unspecified 08/10/2015    Episodic  confusion    Encounter for other screening for malignant neoplasm of breast 09/04/2015    Screening for breast cancer    Impacted cerumen, left ear 01/11/2016    Impacted cerumen of left ear    Insect bite (nonvenomous) of lower back and pelvis, initial encounter 09/04/2015    Tick bite of back    Lyme disease, unspecified 09/04/2015    Erythema chronicum migrans    Other acute postprocedural pain 03/30/2022    Post-op pain    Other symptoms and signs involving the musculoskeletal system 06/29/2015    Weakness of hand    Personal history of diseases of the skin and subcutaneous tissue 01/11/2016    History of seborrhea    Personal history of other diseases of the circulatory system 03/05/2015    History of hypertension    Personal history of other diseases of the circulatory system     History of carotid artery stenosis    Personal history of other diseases of the musculoskeletal system and connective tissue 09/25/2015    History of backache    Personal history of other diseases of the respiratory system 07/01/2016    History of acute bronchitis    Personal history of other mental and behavioral disorders 04/06/2016    History of depression    Personal history of other specified conditions 09/25/2015    History of urinary frequency    Personal history of other specified conditions 08/30/2015    History of headache    Unspecified condition associated with female genital organs and menstrual cycle 01/11/2016    Vaginal burning    Urinary tract infection, site not specified 09/25/2015    Acute UTI    Urinary tract infection, site not specified 09/25/2015    Acute lower UTI    Urinary tract infection, site not specified 01/11/2016    UTI (lower urinary tract infection)       Medication Documentation Review Audit       Reviewed by Ivanna Mireles MA (Medical Assistant) on 03/11/25 at 1351      Medication Order Taking? Sig Documenting Provider Last Dose Status   albuterol 90 mcg/actuation inhaler [id number] No Inhale 2 puffs  every 6 hours if needed for wheezing. Historical MD Ashish Taking Active   amLODIPine (Norvasc) 5 mg tablet [id number]  Take 1 tablet (5 mg) by mouth once daily. Rasheed Jurado MD  Active   atorvastatin (Lipitor) 40 mg tablet [id number]  Take 1 tablet (40 mg) by mouth once daily. Rasheed Jurado MD  Active   calcium carbonate 600 mg calcium (1,500 mg) tablet [id number] No Take 1 tablet (1,500 mg) by mouth once daily. Greta Hinojosa MD Taking Active   cholecalciferol (Vitamin D-3) 50 MCG (2000 UT) tablet [id number] No Take 1 tablet (2,000 Units) by mouth once daily. Greta Hinojosa MD Taking Active   clopidogrel (Plavix) 75 mg tablet [id number]  Take 1 tablet (75 mg) by mouth once daily. Rasheed Jurado MD  Active   glucosamine/chondr lopez A sod (glucosamine-chondroitin) 1,500-1,200 mg/30 mL liquid [id number] No Take by mouth. Use PO as directed Historical MD Ashish Taking Active   levothyroxine (Synthroid, Levoxyl) 88 mcg tablet [id number] No Take 1 tablet (88 mcg) by mouth once daily. Historical MD Ashish Taking Active   methylPREDNISolone (Medrol Dospak) 4 mg tablets [id number]  Follow schedule on package instructions Gerard Storm,   Active   nabumetone (Relafen) 750 mg tablet [id number]  Take 1 tablet (750 mg) by mouth 2 times a day. Marty Hayes MD  Active   omeprazole (PriLOSEC) 40 mg DR capsule [id number] No Take 1 capsule (40 mg) by mouth once daily. Historical Provider, MD Taking Active   traZODone (Desyrel) 150 mg tablet [id number] No Take 2 tablets (300 mg) by mouth once daily at bedtime. Historical MD Ashish Taking Active   vilazodone (Viibryd) 40 mg tablet [id number] No Take 1 tablet (40 mg) by mouth once daily. Greta Hinojosa MD Taking Active   vitamin B complex tablet [id number] No Take 1 tablet by mouth once daily. Historical MD Ashish Taking Active                    Allergies   Allergen Reactions    Colesevelam Other    Diazepam Unknown    Duloxetine Seizure     Fluvastatin Unknown    Iodides Unknown    Iodinated Contrast Media Unknown    Iodine Unknown    Latex Unknown    Morphine Unknown    Other Unknown     ADHESIVE BANDAGES    Pravastatin Unknown       Social History     Socioeconomic History    Marital status:      Spouse name: Not on file    Number of children: Not on file    Years of education: Not on file    Highest education level: Not on file   Occupational History    Not on file   Tobacco Use    Smoking status: Former     Types: Cigarettes    Smokeless tobacco: Never   Substance and Sexual Activity    Alcohol use: Never    Drug use: Not on file    Sexual activity: Not on file   Other Topics Concern    Not on file   Social History Narrative    Not on file     Social Drivers of Health     Financial Resource Strain: Not on file   Food Insecurity: Not on file   Transportation Needs: Not on file   Physical Activity: Not on file   Stress: Not on file   Social Connections: Not on file   Intimate Partner Violence: Not on file   Housing Stability: Not on file       Past Surgical History:   Procedure Laterality Date    APPENDECTOMY  04/07/2015    Appendectomy    BLADDER SURGERY  04/07/2015    Bladder Surgery    BREAST BIOPSY  12/02/2016    Biopsy Breast Percutaneous Needle Core    GALLBLADDER SURGERY  04/07/2015    Gallbladder Surgery    HERNIA REPAIR  04/07/2015    Hernia Repair    HYSTERECTOMY  04/07/2015    Hysterectomy    INCISIONAL BREAST BIOPSY  12/02/2016    Incisional Breast Biopsy    OTHER SURGICAL HISTORY  03/05/2015    Install Vagal Nerve Neurostimulator By Incision With Pulse Generator    OTHER SURGICAL HISTORY  12/02/2016    Salpingo-oophorectomy    OTHER SURGICAL HISTORY  04/07/2015    Breast Surgery Reduction Procedure Elective    OTHER SURGICAL HISTORY  07/13/2022    Cataract surgery    OTHER SURGICAL HISTORY  07/13/2022    Cardiac catheterization    OTHER SURGICAL HISTORY  07/13/2022    Arm surgery    OTHER SURGICAL HISTORY  07/13/2022    Knee  surgery    OTHER SURGICAL HISTORY  07/13/2022    Colonoscopy    OTHER SURGICAL HISTORY  07/13/2022    Rotator cuff repair    OTHER SURGICAL HISTORY  07/13/2022    Surgery    TOTAL KNEE ARTHROPLASTY  04/07/2015    Knee Replacement     Scribe Attestation  By signing my name below, IFidelia Scribe   attest that this documentation has been prepared under the direction and in the presence of Khari Ocampo MD.

## 2025-04-22 ENCOUNTER — OFFICE VISIT (OUTPATIENT)
Dept: ORTHOPEDIC SURGERY | Facility: CLINIC | Age: 75
End: 2025-04-22
Payer: MEDICARE

## 2025-04-22 DIAGNOSIS — S46.011D TRAUMATIC COMPLETE TEAR OF RIGHT ROTATOR CUFF, SUBSEQUENT ENCOUNTER: Primary | ICD-10-CM

## 2025-04-22 PROCEDURE — 20610 DRAIN/INJ JOINT/BURSA W/O US: CPT | Mod: RT | Performed by: ORTHOPAEDIC SURGERY

## 2025-04-22 PROCEDURE — 1159F MED LIST DOCD IN RCRD: CPT | Performed by: ORTHOPAEDIC SURGERY

## 2025-04-22 PROCEDURE — 99212 OFFICE O/P EST SF 10 MIN: CPT | Performed by: ORTHOPAEDIC SURGERY

## 2025-04-22 PROCEDURE — 99214 OFFICE O/P EST MOD 30 MIN: CPT | Performed by: ORTHOPAEDIC SURGERY

## 2025-04-22 PROCEDURE — 1157F ADVNC CARE PLAN IN RCRD: CPT | Performed by: ORTHOPAEDIC SURGERY

## 2025-04-22 PROCEDURE — 2500000004 HC RX 250 GENERAL PHARMACY W/ HCPCS (ALT 636 FOR OP/ED): Performed by: ORTHOPAEDIC SURGERY

## 2025-04-22 RX ORDER — LIDOCAINE HYDROCHLORIDE 10 MG/ML
8 INJECTION, SOLUTION INFILTRATION; PERINEURAL
Status: COMPLETED | OUTPATIENT
Start: 2025-04-22 | End: 2025-04-22

## 2025-04-22 RX ORDER — TRIAMCINOLONE ACETONIDE 40 MG/ML
40 INJECTION, SUSPENSION INTRA-ARTICULAR; INTRAMUSCULAR
Status: COMPLETED | OUTPATIENT
Start: 2025-04-22 | End: 2025-04-22

## 2025-04-22 RX ADMIN — LIDOCAINE HYDROCHLORIDE 8 ML: 10 INJECTION, SOLUTION INFILTRATION; PERINEURAL at 13:58

## 2025-04-22 RX ADMIN — TRIAMCINOLONE ACETONIDE 40 MG: 40 INJECTION, SUSPENSION INTRA-ARTICULAR; INTRAMUSCULAR at 13:58

## 2025-05-19 NOTE — PROGRESS NOTES
History of Present Illness:  Date of Surgery: 8/16/2024.  Right shoulder scope with revision rotator cuff repair. We gave her a cortisone injection into the right shoulder on 4/22/25. She states that she only got about 3 days of relief with the injection until the pain returned. She also has persistent weakness. Her shoulder had been doing well until January when she had a fall.     Imaging:  X-rays right shoulder: Shows well aligned shoulder with minimal arthritis  CT arthrogram right shoulder: Shows a recurrent tear in the supraspinatus at the leading edge as well as operative changes.     Assessment:  Right full thickness rotator cuff tear    Plan:  Continue Nabumetone and ice as needed.    At this point and given her recurrent pain, I recommended a revision right rotator cuff repair. She was doing well until she had a traumatic injury causing the re-tear. We discussed healing rates associated with recurrent tearing, injections, etc.     Revision right shoulder arthroscopic versus open rotator cuff repair  Risks benefits and alternatives to surgery were discussed including but not limited to infection, bleeding, neurovascular injury, pain and dysfunction, hardware related complications including cutout failure breakage, loss of function, motion, and permanent disability as well as the cardiovascular and pulmonary complications from anesthesia including death and DVT. Patient and family accept these risks. We discussed specifically hardware complications including cut out, failure, breakage, incomplete repair, retear, intraoperative decisions regarding the biceps and labral pathology, possible need for open repair, possible need for allograft augmentation as well as potential retear and revision including revision repair or arthroplasty.  We will plan for outpatient surgery and one week postop follow up   Percocet for postop pain relief. [place]ARRS has been reviewed and is consistent with prescribed medications. This  report is scanned into the electronic medical record. The risks of abuse, dependence, addiction and diversion were considered. The medication is felt to be clinically appropriate based on documented diagnosis.     Exam:  Mild effusion  She has persistent weakness throughout with rotator cuff testing and internal rotation.   Portals healed  External rotation to 40  Forward flexion to 130   Full passive motion  4/5 forward flexion strength    Past Medical History:   Diagnosis Date    Acute candidiasis of vulva and vagina 09/04/2015    Vaginal candidiasis    Cervicalgia 04/13/2016    Neck pain    Disorientation, unspecified 08/10/2015    Episodic confusion    Encounter for other screening for malignant neoplasm of breast 09/04/2015    Screening for breast cancer    Impacted cerumen, left ear 01/11/2016    Impacted cerumen of left ear    Insect bite (nonvenomous) of lower back and pelvis, initial encounter 09/04/2015    Tick bite of back    Lyme disease, unspecified 09/04/2015    Erythema chronicum migrans    Other acute postprocedural pain 03/30/2022    Post-op pain    Other symptoms and signs involving the musculoskeletal system 06/29/2015    Weakness of hand    Personal history of diseases of the skin and subcutaneous tissue 01/11/2016    History of seborrhea    Personal history of other diseases of the circulatory system 03/05/2015    History of hypertension    Personal history of other diseases of the circulatory system     History of carotid artery stenosis    Personal history of other diseases of the musculoskeletal system and connective tissue 09/25/2015    History of backache    Personal history of other diseases of the respiratory system 07/01/2016    History of acute bronchitis    Personal history of other mental and behavioral disorders 04/06/2016    History of depression    Personal history of other specified conditions 09/25/2015    History of urinary frequency    Personal history of other specified  conditions 08/30/2015    History of headache    Unspecified condition associated with female genital organs and menstrual cycle 01/11/2016    Vaginal burning    Urinary tract infection, site not specified 09/25/2015    Acute UTI    Urinary tract infection, site not specified 09/25/2015    Acute lower UTI    Urinary tract infection, site not specified 01/11/2016    UTI (lower urinary tract infection)       Medication Documentation Review Audit       Reviewed by Mirna Thomas MA (Medical Assistant) on 04/22/25 at 1336      Medication Order Taking? Sig Documenting Provider Last Dose Status   albuterol 90 mcg/actuation inhaler [id number] No Inhale 2 puffs every 6 hours if needed for wheezing. Historical Provider, MD Taking Active   amLODIPine (Norvasc) 5 mg tablet [id number]  Take 1 tablet (5 mg) by mouth once daily. Rasheed Jurado MD  Active   atorvastatin (Lipitor) 40 mg tablet [id number]  Take 1 tablet (40 mg) by mouth once daily. Rasheed Jurado MD  Active   calcium carbonate 600 mg calcium (1,500 mg) tablet [id number] No Take 1 tablet (1,500 mg) by mouth once daily. Historical Provider, MD Taking Active   cholecalciferol (Vitamin D-3) 50 MCG (2000 UT) tablet [id number] No Take 1 tablet (2,000 Units) by mouth once daily. Historical Provider, MD Taking Active   clopidogrel (Plavix) 75 mg tablet [id number]  Take 1 tablet (75 mg) by mouth once daily. Rasheed Jurado MD  Active   diphenhydrAMINE (BENADryl) 50 mg tablet [id number]  1 hour prior to contrast media Vale Hinkle PA-C  Active   glucosamine/chondr lopez A sod (glucosamine-chondroitin) 1,500-1,200 mg/30 mL liquid [id number] No Take by mouth. Use PO as directed Historical Provider, MD Taking Active   levothyroxine (Synthroid, Levoxyl) 88 mcg tablet [id number] No Take 1 tablet (88 mcg) by mouth once daily. Historical Provider, MD Taking Active   methylPREDNISolone (Medrol Dospak) 4 mg tablets [id number]  Follow schedule on package instructions Gerard ARNOLD  DO Emeterio  Active   methylPREDNISolone (Medrol Dospak) 4 mg tablets [id number]  Use as directed by package instructions Vale Hinkle PA-C  Active   nabumetone (Relafen) 750 mg tablet [id number]  Take 1 tablet (750 mg) by mouth 2 times a day. Marty Hayes MD  Active   omeprazole (PriLOSEC) 40 mg DR capsule [id number] No Take 1 capsule (40 mg) by mouth once daily. Historical Provider, MD Taking Active   predniSONE (Deltasone) 50 mg tablet [id number]  Sig 1 tab 13 hours prior to media contrast, then 7 hours prior to media contrast, then 1 hour prior to media contrast Vale Hinkle PA-C  Active   traZODone (Desyrel) 150 mg tablet [id number] No Take 2 tablets (300 mg) by mouth once daily at bedtime. Historical Provider, MD Taking Active   vilazodone (Viibryd) 40 mg tablet [id number] No Take 1 tablet (40 mg) by mouth once daily. Historical Provider, MD Taking Active   vitamin B complex tablet [id number] No Take 1 tablet by mouth once daily. Historical Provider, MD Taking Active                    Allergies   Allergen Reactions    Colesevelam Other    Diazepam Unknown    Duloxetine Seizure    Fluvastatin Unknown    Iodides Unknown    Iodinated Contrast Media Unknown    Iodine Unknown    Latex Unknown    Morphine Unknown    Other Unknown     ADHESIVE BANDAGES    Pravastatin Unknown       Social History     Socioeconomic History    Marital status:      Spouse name: Not on file    Number of children: Not on file    Years of education: Not on file    Highest education level: Not on file   Occupational History    Not on file   Tobacco Use    Smoking status: Former     Types: Cigarettes    Smokeless tobacco: Never   Substance and Sexual Activity    Alcohol use: Never    Drug use: Not on file    Sexual activity: Not on file   Other Topics Concern    Not on file   Social History Narrative    Not on file     Social Drivers of Health     Financial Resource Strain: Not on file   Food Insecurity: Not on file    Transportation Needs: Not on file   Physical Activity: Not on file   Stress: Not on file   Social Connections: Not on file   Intimate Partner Violence: Not on file   Housing Stability: Not on file       Past Surgical History:   Procedure Laterality Date    APPENDECTOMY  04/07/2015    Appendectomy    BLADDER SURGERY  04/07/2015    Bladder Surgery    BREAST BIOPSY  12/02/2016    Biopsy Breast Percutaneous Needle Core    GALLBLADDER SURGERY  04/07/2015    Gallbladder Surgery    HERNIA REPAIR  04/07/2015    Hernia Repair    HYSTERECTOMY  04/07/2015    Hysterectomy    INCISIONAL BREAST BIOPSY  12/02/2016    Incisional Breast Biopsy    OTHER SURGICAL HISTORY  03/05/2015    Install Vagal Nerve Neurostimulator By Incision With Pulse Generator    OTHER SURGICAL HISTORY  12/02/2016    Salpingo-oophorectomy    OTHER SURGICAL HISTORY  04/07/2015    Breast Surgery Reduction Procedure Elective    OTHER SURGICAL HISTORY  07/13/2022    Cataract surgery    OTHER SURGICAL HISTORY  07/13/2022    Cardiac catheterization    OTHER SURGICAL HISTORY  07/13/2022    Arm surgery    OTHER SURGICAL HISTORY  07/13/2022    Knee surgery    OTHER SURGICAL HISTORY  07/13/2022    Colonoscopy    OTHER SURGICAL HISTORY  07/13/2022    Rotator cuff repair    OTHER SURGICAL HISTORY  07/13/2022    Surgery    TOTAL KNEE ARTHROPLASTY  04/07/2015    Knee Replacement     Scribe Attestation:  By signing my name below, I, Matthew Coleman   attest that this documentation has been prepared under the direction and in the presence of Khari Ocampo MD.          Provider Attestation - Scribe documentation:  All medical record entries made by Fidelia Eason were at my direction and personally dictated by me. I have reviewed the chart and agree that the record is accurate and I confirmed that it reflects my personal performance of the history, physical exam, discussion and plan.

## 2025-05-20 ENCOUNTER — OFFICE VISIT (OUTPATIENT)
Dept: ORTHOPEDIC SURGERY | Facility: CLINIC | Age: 75
End: 2025-05-20
Payer: MEDICARE

## 2025-05-20 DIAGNOSIS — S46.011D TRAUMATIC COMPLETE TEAR OF RIGHT ROTATOR CUFF, SUBSEQUENT ENCOUNTER: Primary | ICD-10-CM

## 2025-05-20 PROCEDURE — 99214 OFFICE O/P EST MOD 30 MIN: CPT | Performed by: ORTHOPAEDIC SURGERY

## 2025-05-20 PROCEDURE — 99212 OFFICE O/P EST SF 10 MIN: CPT | Performed by: ORTHOPAEDIC SURGERY

## 2025-05-26 DIAGNOSIS — Z01.818 PRE-OP TESTING: ICD-10-CM

## 2025-05-27 ENCOUNTER — HOSPITAL ENCOUNTER (OUTPATIENT)
Dept: CARDIOLOGY | Facility: HOSPITAL | Age: 75
Discharge: HOME | End: 2025-05-27
Payer: MEDICARE

## 2025-05-27 ENCOUNTER — LAB (OUTPATIENT)
Dept: LAB | Facility: HOSPITAL | Age: 75
End: 2025-05-27
Payer: MEDICARE

## 2025-05-27 ENCOUNTER — TELEPHONE (OUTPATIENT)
Dept: ORTHOPEDIC SURGERY | Facility: CLINIC | Age: 75
End: 2025-05-27
Payer: MEDICARE

## 2025-05-27 DIAGNOSIS — Z01.818 PRE-OP TESTING: ICD-10-CM

## 2025-05-27 LAB
ALBUMIN SERPL BCP-MCNC: 3.9 G/DL (ref 3.4–5)
ALP SERPL-CCNC: 53 U/L (ref 33–136)
ALT SERPL W P-5'-P-CCNC: 14 U/L (ref 7–45)
ANION GAP SERPL CALC-SCNC: 14 MMOL/L (ref 10–20)
APTT PPP: 26 SECONDS (ref 26–36)
AST SERPL W P-5'-P-CCNC: 20 U/L (ref 9–39)
BASOPHILS # BLD AUTO: 0.03 X10*3/UL (ref 0–0.1)
BASOPHILS NFR BLD AUTO: 0.5 %
BILIRUB SERPL-MCNC: 0.5 MG/DL (ref 0–1.2)
BUN SERPL-MCNC: 15 MG/DL (ref 6–23)
CALCIUM SERPL-MCNC: 9.1 MG/DL (ref 8.6–10.3)
CHLORIDE SERPL-SCNC: 105 MMOL/L (ref 98–107)
CO2 SERPL-SCNC: 29 MMOL/L (ref 21–32)
CREAT SERPL-MCNC: 1.17 MG/DL (ref 0.5–1.05)
EGFRCR SERPLBLD CKD-EPI 2021: 49 ML/MIN/1.73M*2
EOSINOPHIL # BLD AUTO: 0.11 X10*3/UL (ref 0–0.4)
EOSINOPHIL NFR BLD AUTO: 1.9 %
ERYTHROCYTE [DISTWIDTH] IN BLOOD BY AUTOMATED COUNT: 13 % (ref 11.5–14.5)
GLUCOSE SERPL-MCNC: 172 MG/DL (ref 74–99)
HCT VFR BLD AUTO: 36 % (ref 36–46)
HGB BLD-MCNC: 12.4 G/DL (ref 12–16)
IMM GRANULOCYTES # BLD AUTO: 0.02 X10*3/UL (ref 0–0.5)
IMM GRANULOCYTES NFR BLD AUTO: 0.3 % (ref 0–0.9)
INR PPP: 0.9 (ref 0.9–1.1)
LYMPHOCYTES # BLD AUTO: 1.3 X10*3/UL (ref 0.8–3)
LYMPHOCYTES NFR BLD AUTO: 22.6 %
MCH RBC QN AUTO: 33.7 PG (ref 26–34)
MCHC RBC AUTO-ENTMCNC: 34.4 G/DL (ref 32–36)
MCV RBC AUTO: 98 FL (ref 80–100)
MONOCYTES # BLD AUTO: 0.37 X10*3/UL (ref 0.05–0.8)
MONOCYTES NFR BLD AUTO: 6.4 %
NEUTROPHILS # BLD AUTO: 3.91 X10*3/UL (ref 1.6–5.5)
NEUTROPHILS NFR BLD AUTO: 68.3 %
NRBC BLD-RTO: 0 /100 WBCS (ref 0–0)
PLATELET # BLD AUTO: 192 X10*3/UL (ref 150–450)
POTASSIUM SERPL-SCNC: 4.6 MMOL/L (ref 3.5–5.3)
PROT SERPL-MCNC: 5.9 G/DL (ref 6.4–8.2)
PROTHROMBIN TIME: 10.2 SECONDS (ref 9.8–12.4)
RBC # BLD AUTO: 3.68 X10*6/UL (ref 4–5.2)
SODIUM SERPL-SCNC: 143 MMOL/L (ref 136–145)
WBC # BLD AUTO: 5.7 X10*3/UL (ref 4.4–11.3)

## 2025-05-27 PROCEDURE — 85025 COMPLETE CBC W/AUTO DIFF WBC: CPT

## 2025-05-27 PROCEDURE — 85610 PROTHROMBIN TIME: CPT

## 2025-05-27 PROCEDURE — 36415 COLL VENOUS BLD VENIPUNCTURE: CPT | Performed by: ORTHOPAEDIC SURGERY

## 2025-05-27 PROCEDURE — 85730 THROMBOPLASTIN TIME PARTIAL: CPT

## 2025-05-27 PROCEDURE — 93005 ELECTROCARDIOGRAM TRACING: CPT

## 2025-05-27 PROCEDURE — 80053 COMPREHEN METABOLIC PANEL: CPT

## 2025-05-27 NOTE — TELEPHONE ENCOUNTER
5/27/25 - Called [facility] for patient to call DME at [phone].   Patient scheduled for rotator cuff surgery on 6/23/25 and according to our records, she was fit with sling w/pillow on 7/25/24  for same side for surgery in 2024.   According to notes, patient had a re-injury to same arm that will require surgery.   If pt does have sling and is in good shape, she will not need another one.   If pt no longer has the sling, then we will schedule her to fit with a new one as this is a new injury.

## 2025-05-30 ENCOUNTER — TELEPHONE (OUTPATIENT)
Dept: ORTHOPEDIC SURGERY | Facility: CLINIC | Age: 75
End: 2025-05-30
Payer: MEDICARE

## 2025-05-31 LAB
ATRIAL RATE: 70 BPM
P AXIS: 58 DEGREES
P OFFSET: 199 MS
P ONSET: 130 MS
PR INTERVAL: 166 MS
Q ONSET: 213 MS
QRS COUNT: 12 BEATS
QRS DURATION: 86 MS
QT INTERVAL: 402 MS
QTC CALCULATION(BAZETT): 434 MS
QTC FREDERICIA: 423 MS
R AXIS: -13 DEGREES
T AXIS: 22 DEGREES
T OFFSET: 414 MS
VENTRICULAR RATE: 70 BPM

## 2025-06-02 ENCOUNTER — APPOINTMENT (OUTPATIENT)
Dept: ORTHOPEDIC SURGERY | Facility: CLINIC | Age: 75
End: 2025-06-02
Payer: MEDICARE

## 2025-06-02 PROCEDURE — L3670 SO ACRO/CLAV CAN WEB PRE OTS: HCPCS | Performed by: ORTHOPAEDIC SURGERY

## 2025-06-12 ENCOUNTER — TELEPHONE (OUTPATIENT)
Dept: CARDIOLOGY | Facility: CLINIC | Age: 75
End: 2025-06-12
Payer: MEDICARE

## 2025-06-12 NOTE — TELEPHONE ENCOUNTER
POC rec'd for rotator cuff surgery to be done on 6/23/25.   Will be placed on Dr. Rasheed Jurado desrenato to review.

## 2025-06-19 ENCOUNTER — DOCUMENTATION (OUTPATIENT)
Dept: ORTHOPEDIC SURGERY | Facility: CLINIC | Age: 75
End: 2025-06-19
Payer: MEDICARE

## 2025-06-19 DIAGNOSIS — S46.011D TRAUMATIC COMPLETE TEAR OF RIGHT ROTATOR CUFF, SUBSEQUENT ENCOUNTER: Primary | ICD-10-CM

## 2025-06-19 RX ORDER — OXYCODONE AND ACETAMINOPHEN 5; 325 MG/1; MG/1
1 TABLET ORAL EVERY 6 HOURS PRN
Qty: 20 TABLET | Refills: 0 | Status: SHIPPED | OUTPATIENT
Start: 2025-06-23 | End: 2025-06-30

## 2025-06-19 NOTE — PROGRESS NOTES
History of Present Illness:  Date of Surgery: 8/16/2024.  Right shoulder scope with revision rotator cuff repair.  Patient was doing great from her surgical recovery until January when she fell sustaining a reinjury.  Since that time she has had persistent weakness and pain.  We gave her a cortisone injection into the right shoulder on 4/22/25. She states that she only got about 3 days of relief with the injection until the pain returned. She also has persistent weakness. Her shoulder had been doing well until January when she had a fall.     Imaging:  X-rays right shoulder: Shows well aligned shoulder with minimal arthritis  CT arthrogram right shoulder: Shows a recurrent tear in the supraspinatus at the leading edge as well as operative changes.     Assessment:  Right full thickness rotator cuff tear    Plan:  Continue Nabumetone and ice as needed.    At this point, I recommended a revision right rotator cuff repair as she has evidence if a retear. She was doing well until she had a traumatic injury causing the re-tear. We discussed healing rates associated with recurrent tearing, injections, etc.     Revision right shoulder arthroscopic versus open rotator cuff repair  Risks benefits and alternatives to surgery were discussed including but not limited to infection, bleeding, neurovascular injury, pain and dysfunction, hardware related complications including cutout failure breakage, loss of function, motion, and permanent disability as well as the cardiovascular and pulmonary complications from anesthesia including death and DVT. Patient and family accept these risks. We discussed specifically hardware complications including cut out, failure, breakage, incomplete repair, retear, intraoperative decisions regarding the biceps and labral pathology, possible need for open repair, possible need for allograft augmentation as well as potential retear and revision including revision repair or arthroplasty.  We will  plan for outpatient surgery and one week postop follow up   Percocet for postop pain relief. OARRS has been reviewed and is consistent with prescribed medications. This report is scanned into the electronic medical record. The risks of abuse, dependence, addiction and diversion were considered. The medication is felt to be clinically appropriate based on documented diagnosis.     Exam:  Head/neck: Atraumatic, normocephalic  Cardiovascular: Palpable pulse regular to extremities  Pulmonary: Respirations regular, no audible wheeze  Abdomen: Soft, nondistended  Mild effusion  She has persistent weakness throughout with rotator cuff testing and internal rotation.   Portals healed  External rotation to 40  Forward flexion to 130   Full passive motion  4/5 forward flexion strength    Past Medical History:   Diagnosis Date    Acute candidiasis of vulva and vagina 09/04/2015    Vaginal candidiasis    Cervicalgia 04/13/2016    Neck pain    Disorientation, unspecified 08/10/2015    Episodic confusion    Encounter for other screening for malignant neoplasm of breast 09/04/2015    Screening for breast cancer    Impacted cerumen, left ear 01/11/2016    Impacted cerumen of left ear    Insect bite (nonvenomous) of lower back and pelvis, initial encounter 09/04/2015    Tick bite of back    Lyme disease, unspecified 09/04/2015    Erythema chronicum migrans    Other acute postprocedural pain 03/30/2022    Post-op pain    Other symptoms and signs involving the musculoskeletal system 06/29/2015    Weakness of hand    Personal history of diseases of the skin and subcutaneous tissue 01/11/2016    History of seborrhea    Personal history of other diseases of the circulatory system 03/05/2015    History of hypertension    Personal history of other diseases of the circulatory system     History of carotid artery stenosis    Personal history of other diseases of the musculoskeletal system and connective tissue 09/25/2015    History of  backache    Personal history of other diseases of the respiratory system 07/01/2016    History of acute bronchitis    Personal history of other mental and behavioral disorders 04/06/2016    History of depression    Personal history of other specified conditions 09/25/2015    History of urinary frequency    Personal history of other specified conditions 08/30/2015    History of headache    Unspecified condition associated with female genital organs and menstrual cycle 01/11/2016    Vaginal burning    Urinary tract infection, site not specified 09/25/2015    Acute UTI    Urinary tract infection, site not specified 09/25/2015    Acute lower UTI    Urinary tract infection, site not specified 01/11/2016    UTI (lower urinary tract infection)       Medication Documentation Review Audit       Reviewed by Mirna Thomas MA (Medical Assistant) on 04/22/25 at 1336      Medication Order Taking? Sig Documenting Provider Last Dose Status   albuterol 90 mcg/actuation inhaler [id number] No Inhale 2 puffs every 6 hours if needed for wheezing. Historical MD Ashish Taking Active   amLODIPine (Norvasc) 5 mg tablet [id number]  Take 1 tablet (5 mg) by mouth once daily. Rasheed Jurado MD  Active   atorvastatin (Lipitor) 40 mg tablet [id number]  Take 1 tablet (40 mg) by mouth once daily. Rasheed Jurado MD  Active   calcium carbonate 600 mg calcium (1,500 mg) tablet [id number] No Take 1 tablet (1,500 mg) by mouth once daily. Greta Provider, MD Taking Active   cholecalciferol (Vitamin D-3) 50 MCG (2000 UT) tablet [id number] No Take 1 tablet (2,000 Units) by mouth once daily. Greta Provider, MD Taking Active   clopidogrel (Plavix) 75 mg tablet [id number]  Take 1 tablet (75 mg) by mouth once daily. Rasheed Jurado MD  Active   diphenhydrAMINE (BENADryl) 50 mg tablet [id number]  1 hour prior to contrast media Vale Hinkle PA-C  Active   glucosamine/chondr lopez A sod (glucosamine-chondroitin) 1,500-1,200 mg/30 mL liquid  [id number] No Take by mouth. Use PO as directed Historical Provider, MD Taking Active   levothyroxine (Synthroid, Levoxyl) 88 mcg tablet [id number] No Take 1 tablet (88 mcg) by mouth once daily. Historical Provider, MD Taking Active   methylPREDNISolone (Medrol Dospak) 4 mg tablets [id number]  Follow schedule on package instructions Gerard Storm,   Active   methylPREDNISolone (Medrol Dospak) 4 mg tablets [id number]  Use as directed by package instructions Vale Hinkle PA-C  Active   nabumetone (Relafen) 750 mg tablet [id number]  Take 1 tablet (750 mg) by mouth 2 times a day. Marty Hayes MD  Active   omeprazole (PriLOSEC) 40 mg DR capsule [id number] No Take 1 capsule (40 mg) by mouth once daily. Historical Provider, MD Taking Active   predniSONE (Deltasone) 50 mg tablet [id number]  Sig 1 tab 13 hours prior to media contrast, then 7 hours prior to media contrast, then 1 hour prior to media contrast Vale Hinkle PA-C  Active   traZODone (Desyrel) 150 mg tablet [id number] No Take 2 tablets (300 mg) by mouth once daily at bedtime. Historical Provider, MD Taking Active   vilazodone (Viibryd) 40 mg tablet [id number] No Take 1 tablet (40 mg) by mouth once daily. Historical Provider, MD Taking Active   vitamin B complex tablet [id number] No Take 1 tablet by mouth once daily. Historical Provider, MD Taking Active                    Allergies   Allergen Reactions    Colesevelam Other    Diazepam Unknown    Duloxetine Seizure    Fluvastatin Unknown    Iodides Unknown    Iodinated Contrast Media Unknown    Iodine Unknown    Latex Unknown    Morphine Unknown    Other Unknown     ADHESIVE BANDAGES    Pravastatin Unknown       Social History     Socioeconomic History    Marital status:      Spouse name: Not on file    Number of children: Not on file    Years of education: Not on file    Highest education level: Not on file   Occupational History    Not on file   Tobacco Use    Smoking status: Former     Types:  Cigarettes    Smokeless tobacco: Never   Substance and Sexual Activity    Alcohol use: Never    Drug use: Not on file    Sexual activity: Not on file   Other Topics Concern    Not on file   Social History Narrative    Not on file     Social Drivers of Health     Financial Resource Strain: Not on file   Food Insecurity: Not on file   Transportation Needs: Not on file   Physical Activity: Not on file   Stress: Not on file   Social Connections: Not on file   Intimate Partner Violence: Not on file   Housing Stability: Not on file       Past Surgical History:   Procedure Laterality Date    APPENDECTOMY  04/07/2015    Appendectomy    BLADDER SURGERY  04/07/2015    Bladder Surgery    BREAST BIOPSY  12/02/2016    Biopsy Breast Percutaneous Needle Core    GALLBLADDER SURGERY  04/07/2015    Gallbladder Surgery    HERNIA REPAIR  04/07/2015    Hernia Repair    HYSTERECTOMY  04/07/2015    Hysterectomy    INCISIONAL BREAST BIOPSY  12/02/2016    Incisional Breast Biopsy    OTHER SURGICAL HISTORY  03/05/2015    Install Vagal Nerve Neurostimulator By Incision With Pulse Generator    OTHER SURGICAL HISTORY  12/02/2016    Salpingo-oophorectomy    OTHER SURGICAL HISTORY  04/07/2015    Breast Surgery Reduction Procedure Elective    OTHER SURGICAL HISTORY  07/13/2022    Cataract surgery    OTHER SURGICAL HISTORY  07/13/2022    Cardiac catheterization    OTHER SURGICAL HISTORY  07/13/2022    Arm surgery    OTHER SURGICAL HISTORY  07/13/2022    Knee surgery    OTHER SURGICAL HISTORY  07/13/2022    Colonoscopy    OTHER SURGICAL HISTORY  07/13/2022    Rotator cuff repair    OTHER SURGICAL HISTORY  07/13/2022    Surgery    TOTAL KNEE ARTHROPLASTY  04/07/2015    Knee Replacement

## 2025-06-23 ENCOUNTER — TELEPHONE (OUTPATIENT)
Dept: ORTHOPEDIC SURGERY | Facility: CLINIC | Age: 75
End: 2025-06-23
Payer: MEDICARE

## 2025-06-23 DIAGNOSIS — S46.011D TRAUMATIC COMPLETE TEAR OF RIGHT ROTATOR CUFF, SUBSEQUENT ENCOUNTER: Primary | ICD-10-CM

## 2025-06-23 PROCEDURE — 20680 REMOVAL OF IMPLANT DEEP: CPT | Performed by: ORTHOPAEDIC SURGERY

## 2025-06-23 PROCEDURE — 29827 SHO ARTHRS SRG RT8TR CUF RPR: CPT | Performed by: ORTHOPAEDIC SURGERY

## 2025-06-23 PROCEDURE — 29825 SHO ARTHRS SRG LSS&RESCJ ADS: CPT | Performed by: ORTHOPAEDIC SURGERY

## 2025-06-23 RX ORDER — TRAMADOL HYDROCHLORIDE 50 MG/1
50 TABLET, FILM COATED ORAL EVERY 6 HOURS PRN
Qty: 28 TABLET | Refills: 0 | Status: SHIPPED | OUTPATIENT
Start: 2025-06-23 | End: 2025-06-24 | Stop reason: ALTCHOICE

## 2025-06-23 NOTE — TELEPHONE ENCOUNTER
Pharmacist called stating the prescription for Tramadol needs to be changed. The pharmacist stated he can not dispense more than 30 MME. He would like the prescription to say take Q8H with a quantity of 21. Please call the pharmacy with a change at [phone] or send a new one.

## 2025-06-24 DIAGNOSIS — S46.011D TRAUMATIC COMPLETE TEAR OF RIGHT ROTATOR CUFF, SUBSEQUENT ENCOUNTER: ICD-10-CM

## 2025-06-24 RX ORDER — TRAMADOL HYDROCHLORIDE 50 MG/1
50 TABLET, FILM COATED ORAL EVERY 8 HOURS PRN
Qty: 21 TABLET | Refills: 0 | Status: SHIPPED | OUTPATIENT
Start: 2025-06-24 | End: 2025-07-01

## 2025-06-24 NOTE — TELEPHONE ENCOUNTER
[facility] pharmacy called again stating there is no direction on the medication for tramadol and would like a call back [phone]

## 2025-07-02 NOTE — PROGRESS NOTES
History of Present Illness:  Date of Surgery: 6/23/25. Right shoulder arthroscopic rotator cuff repair and hardware removal. The shoulder is doing well today without complaints.     Exam:  Mild effusion  Incision is clean, dry, intact, and healing well    Assessment:  Patient status post right shoulder arthroscopic rotator cuff repair and hardware removal.     Plan:  Ice and ibuprofen as needed.   Follow-up in 4 weeks    Scribe Attestation:  By signing my name below, I, Matthew Coleman attest that this documentation has been prepared under the direction and in the presence of Khari Ocampo MD.    Provider Attestation - Scribe documentation:  All medical record entries made by Fidelia Eason were at my direction and personally dictated by me, Khari Ocampo MD. I have reviewed the chart and agree that the record is accurate and I confirmed that it reflects my personal performance of the history, physical exam, discussion, and plan.

## 2025-07-03 ENCOUNTER — OFFICE VISIT (OUTPATIENT)
Dept: ORTHOPEDIC SURGERY | Facility: CLINIC | Age: 75
End: 2025-07-03
Payer: MEDICARE

## 2025-07-03 DIAGNOSIS — S46.011D TRAUMATIC COMPLETE TEAR OF RIGHT ROTATOR CUFF, SUBSEQUENT ENCOUNTER: Primary | ICD-10-CM

## 2025-07-08 ENCOUNTER — APPOINTMENT (OUTPATIENT)
Dept: ORTHOPEDIC SURGERY | Facility: CLINIC | Age: 75
End: 2025-07-08
Payer: MEDICARE

## 2025-08-05 ENCOUNTER — OFFICE VISIT (OUTPATIENT)
Dept: ORTHOPEDIC SURGERY | Facility: CLINIC | Age: 75
End: 2025-08-05
Payer: MEDICARE

## 2025-08-05 DIAGNOSIS — Z98.890 STATUS POST RIGHT ROTATOR CUFF REPAIR: Primary | ICD-10-CM

## 2025-08-05 PROCEDURE — 99024 POSTOP FOLLOW-UP VISIT: CPT | Performed by: ORTHOPAEDIC SURGERY

## 2025-08-05 PROCEDURE — 99212 OFFICE O/P EST SF 10 MIN: CPT | Performed by: ORTHOPAEDIC SURGERY

## 2025-08-05 NOTE — PROGRESS NOTES
History of Present Illness:  Date of Surgery: 6/23/25. Right shoulder arthroscopic rotator cuff repair and hardware removal. She is doing well today without complaints.     Exam:  Mild effusion  Incision is clean, dry, intact, and healing well    Assessment:  Patient status post right shoulder arthroscopic rotator cuff repair and hardware removal.     Plan:  Ice and ibuprofen as needed.   Physical therapy for rotator cuff  Follow-up in 6 weeks    Scribe Attestation:  By signing my name below, I, Matthew Coleman attest that this documentation has been prepared under the direction and in the presence of Khari Ocampo MD.    Provider Attestation - Scribe documentation:  All medical record entries made by Fidelia Eason were at my direction and personally dictated by me, Khari Ocampo MD. I have reviewed the chart and agree that the record is accurate and I confirmed that it reflects my personal performance of the history, physical exam, discussion, and plan.

## 2025-09-15 ENCOUNTER — APPOINTMENT (OUTPATIENT)
Dept: CARDIOLOGY | Facility: CLINIC | Age: 75
End: 2025-09-15
Payer: MEDICARE

## 2025-09-16 ENCOUNTER — APPOINTMENT (OUTPATIENT)
Dept: ORTHOPEDIC SURGERY | Facility: CLINIC | Age: 75
End: 2025-09-16
Payer: MEDICARE

## 2025-11-24 ENCOUNTER — APPOINTMENT (OUTPATIENT)
Dept: NEUROLOGY | Facility: CLINIC | Age: 75
End: 2025-11-24
Payer: MEDICARE